# Patient Record
Sex: FEMALE | Race: WHITE | NOT HISPANIC OR LATINO | Employment: OTHER | URBAN - METROPOLITAN AREA
[De-identification: names, ages, dates, MRNs, and addresses within clinical notes are randomized per-mention and may not be internally consistent; named-entity substitution may affect disease eponyms.]

---

## 2017-01-03 RX ORDER — ALLOPURINOL 300 MG/1
TABLET ORAL
Qty: 90 TABLET | Refills: 1 | Status: SHIPPED | OUTPATIENT
Start: 2017-01-03 | End: 2017-07-02 | Stop reason: SDUPTHER

## 2017-01-09 RX ORDER — LEVALBUTEROL TARTRATE 45 MCG
HFA AEROSOL WITH ADAPTER (GRAM) INHALATION
Qty: 60 G | Refills: 0 | Status: SHIPPED | OUTPATIENT
Start: 2017-01-09 | End: 2017-04-07 | Stop reason: SDUPTHER

## 2017-02-17 RX ORDER — SPIRONOLACTONE 25 MG/1
TABLET ORAL
Qty: 90 TABLET | Refills: 1 | Status: SHIPPED | OUTPATIENT
Start: 2017-02-17 | End: 2017-08-16 | Stop reason: SDUPTHER

## 2017-02-17 RX ORDER — ROPINIROLE 0.5 MG/1
TABLET, FILM COATED ORAL
Qty: 180 TABLET | Refills: 1 | Status: SHIPPED | OUTPATIENT
Start: 2017-02-17 | End: 2017-08-16 | Stop reason: SDUPTHER

## 2017-03-17 RX ORDER — PYRIDOXINE HCL (VITAMIN B6) 100 MG
TABLET ORAL
Qty: 100 TABLET | Refills: 0 | Status: SHIPPED | OUTPATIENT
Start: 2017-03-17 | End: 2017-04-13 | Stop reason: SDUPTHER

## 2017-04-07 RX ORDER — LEVALBUTEROL TARTRATE 45 MCG
HFA AEROSOL WITH ADAPTER (GRAM) INHALATION
Qty: 60 G | Refills: 1 | Status: SHIPPED | OUTPATIENT
Start: 2017-04-07 | End: 2017-10-05 | Stop reason: SDUPTHER

## 2017-04-13 RX ORDER — TRIMETHOPRIM 100 MG/1
100 TABLET ORAL DAILY
Qty: 90 TABLET | Refills: 0 | Status: SHIPPED | OUTPATIENT
Start: 2017-04-13 | End: 2017-06-20 | Stop reason: SDUPTHER

## 2017-04-13 RX ORDER — PYRIDOXINE HCL (VITAMIN B6) 100 MG
100 TABLET ORAL 2 TIMES DAILY
Qty: 270 TABLET | Refills: 3 | Status: SHIPPED | OUTPATIENT
Start: 2017-04-13

## 2017-05-09 RX ORDER — CYANOCOBALAMIN 1000 UG/ML
INJECTION, SOLUTION INTRAMUSCULAR; SUBCUTANEOUS
Qty: 3 ML | Refills: 2 | Status: SHIPPED | OUTPATIENT
Start: 2017-05-09 | End: 2018-02-03 | Stop reason: SDUPTHER

## 2017-05-10 ENCOUNTER — OFFICE VISIT (OUTPATIENT)
Dept: INTERNAL MEDICINE | Facility: CLINIC | Age: 82
End: 2017-05-10

## 2017-05-10 ENCOUNTER — OFFICE VISIT (OUTPATIENT)
Dept: CARDIOLOGY | Facility: CLINIC | Age: 82
End: 2017-05-10

## 2017-05-10 VITALS
BODY MASS INDEX: 40.5 KG/M2 | DIASTOLIC BLOOD PRESSURE: 82 MMHG | WEIGHT: 175 LBS | HEIGHT: 55 IN | HEART RATE: 96 BPM | SYSTOLIC BLOOD PRESSURE: 144 MMHG

## 2017-05-10 VITALS
SYSTOLIC BLOOD PRESSURE: 140 MMHG | DIASTOLIC BLOOD PRESSURE: 90 MMHG | BODY MASS INDEX: 33.1 KG/M2 | RESPIRATION RATE: 16 BRPM | OXYGEN SATURATION: 95 % | HEART RATE: 85 BPM | TEMPERATURE: 97.7 F | HEIGHT: 60 IN | WEIGHT: 168.6 LBS

## 2017-05-10 DIAGNOSIS — Z79.899 MEDICATION MANAGEMENT: ICD-10-CM

## 2017-05-10 DIAGNOSIS — R53.83 FATIGUE, UNSPECIFIED TYPE: ICD-10-CM

## 2017-05-10 DIAGNOSIS — I27.20 PULMONARY HYPERTENSION (HCC): ICD-10-CM

## 2017-05-10 DIAGNOSIS — I48.19 PERSISTENT ATRIAL FIBRILLATION (HCC): ICD-10-CM

## 2017-05-10 DIAGNOSIS — I36.1 NON-RHEUMATIC TRICUSPID VALVE INSUFFICIENCY: ICD-10-CM

## 2017-05-10 DIAGNOSIS — J45.30 MILD PERSISTENT ASTHMA WITHOUT COMPLICATION: ICD-10-CM

## 2017-05-10 DIAGNOSIS — I34.0 NON-RHEUMATIC MITRAL REGURGITATION: ICD-10-CM

## 2017-05-10 DIAGNOSIS — I48.20 CHRONIC ATRIAL FIBRILLATION (HCC): Primary | ICD-10-CM

## 2017-05-10 DIAGNOSIS — R06.09 DYSPNEA ON EXERTION: ICD-10-CM

## 2017-05-10 DIAGNOSIS — I10 BENIGN ESSENTIAL HYPERTENSION: Primary | ICD-10-CM

## 2017-05-10 DIAGNOSIS — D47.2 MGUS (MONOCLONAL GAMMOPATHY OF UNKNOWN SIGNIFICANCE): ICD-10-CM

## 2017-05-10 PROCEDURE — G0439 PPPS, SUBSEQ VISIT: HCPCS | Performed by: INTERNAL MEDICINE

## 2017-05-10 PROCEDURE — 99214 OFFICE O/P EST MOD 30 MIN: CPT | Performed by: INTERNAL MEDICINE

## 2017-05-10 PROCEDURE — 93000 ELECTROCARDIOGRAM COMPLETE: CPT | Performed by: INTERNAL MEDICINE

## 2017-05-11 ENCOUNTER — TELEPHONE (OUTPATIENT)
Dept: INTERNAL MEDICINE | Facility: CLINIC | Age: 82
End: 2017-05-11

## 2017-05-11 RX ORDER — DILTIAZEM HYDROCHLORIDE 120 MG/1
120 TABLET, FILM COATED ORAL DAILY
Qty: 90 TABLET | Refills: 1 | Status: SHIPPED | OUTPATIENT
Start: 2017-05-11 | End: 2017-05-15 | Stop reason: SDUPTHER

## 2017-05-15 RX ORDER — DILTIAZEM HYDROCHLORIDE 120 MG/1
120 TABLET, FILM COATED ORAL DAILY
Qty: 90 TABLET | Refills: 1 | Status: SHIPPED | OUTPATIENT
Start: 2017-05-15 | End: 2017-05-17 | Stop reason: ALTCHOICE

## 2017-05-16 ENCOUNTER — TELEPHONE (OUTPATIENT)
Dept: INTERNAL MEDICINE | Facility: CLINIC | Age: 82
End: 2017-05-16

## 2017-05-16 ENCOUNTER — TELEPHONE (OUTPATIENT)
Dept: CARDIOLOGY | Facility: CLINIC | Age: 82
End: 2017-05-16

## 2017-05-16 LAB
ALBUMIN SERPL-MCNC: 4.4 G/DL (ref 3.5–5.2)
ALBUMIN/GLOB SERPL: 1.5 G/DL
ALP SERPL-CCNC: 51 U/L (ref 39–117)
ALT SERPL-CCNC: 16 U/L (ref 1–33)
AST SERPL-CCNC: 25 U/L (ref 1–32)
BILIRUB SERPL-MCNC: 0.7 MG/DL (ref 0.1–1.2)
BUN SERPL-MCNC: 26 MG/DL (ref 8–23)
BUN/CREAT SERPL: 20.2 (ref 7–25)
CALCIUM SERPL-MCNC: 9.8 MG/DL (ref 8.6–10.5)
CHLORIDE SERPL-SCNC: 96 MMOL/L (ref 98–107)
CO2 SERPL-SCNC: 24.8 MMOL/L (ref 22–29)
CREAT SERPL-MCNC: 1.29 MG/DL (ref 0.57–1)
FOLATE SERPL-MCNC: >20 NG/ML (ref 4.78–24.2)
GLOBULIN SER CALC-MCNC: 3 GM/DL
GLUCOSE SERPL-MCNC: 98 MG/DL (ref 65–99)
HCT VFR BLD AUTO: (no result) %
HGB BLD-MCNC: (no result) G/DL
IGA SERPL-MCNC: 117 MG/DL (ref 64–422)
IGG SERPL-MCNC: 1108 MG/DL (ref 700–1600)
IGM SERPL-MCNC: 43 MG/DL (ref 26–217)
KAPPA LC FREE SER-MCNC: 46.73 MG/L (ref 3.3–19.4)
KAPPA LC FREE/LAMBDA FREE SER: 2.27 {RATIO} (ref 0.26–1.65)
LAMBDA LC FREE SERPL-MCNC: 20.62 MG/L (ref 5.71–26.3)
METHYLMALONATE SERPL-SCNC: 213 NMOL/L (ref 0–378)
PLATELET # BLD AUTO: (no result) 10*3/UL
POTASSIUM SERPL-SCNC: 4 MMOL/L (ref 3.5–5.2)
PROT SERPL-MCNC: 7.4 G/DL (ref 6–8.5)
RBC # BLD AUTO: (no result) 10*6/UL
REQUEST PROBLEM: NORMAL
SODIUM SERPL-SCNC: 137 MMOL/L (ref 136–145)
TSH SERPL DL<=0.005 MIU/L-ACNC: 2.03 MIU/ML (ref 0.27–4.2)
VIT B12 SERPL-MCNC: 661 PG/ML (ref 211–946)
WBC # BLD AUTO: (no result) 10*3/MM3

## 2017-05-17 ENCOUNTER — TELEPHONE (OUTPATIENT)
Dept: INTERNAL MEDICINE | Facility: CLINIC | Age: 82
End: 2017-05-17

## 2017-05-17 RX ORDER — DILTIAZEM HYDROCHLORIDE 120 MG/1
120 CAPSULE, COATED, EXTENDED RELEASE ORAL DAILY
Qty: 30 CAPSULE | Refills: 3 | Status: SHIPPED | OUTPATIENT
Start: 2017-05-17 | End: 2017-06-05

## 2017-05-17 RX ORDER — PANTOPRAZOLE SODIUM 40 MG/1
40 TABLET, DELAYED RELEASE ORAL EVERY MORNING
Qty: 90 TABLET | Refills: 3 | Status: SHIPPED | OUTPATIENT
Start: 2017-05-17 | End: 2017-11-14 | Stop reason: HOSPADM

## 2017-05-17 RX ORDER — DILTIAZEM HYDROCHLORIDE 120 MG/1
120 CAPSULE, COATED, EXTENDED RELEASE ORAL DAILY
Qty: 90 CAPSULE | Refills: 3 | Status: SHIPPED | OUTPATIENT
Start: 2017-05-17 | End: 2017-05-17 | Stop reason: SDUPTHER

## 2017-05-18 RX ORDER — FOLIC ACID 1 MG/1
TABLET ORAL
Qty: 450 TABLET | Refills: 0 | Status: SHIPPED | OUTPATIENT
Start: 2017-05-18 | End: 2017-08-12 | Stop reason: SDUPTHER

## 2017-06-02 RX ORDER — MONTELUKAST SODIUM 10 MG/1
TABLET ORAL
Qty: 90 TABLET | Refills: 0 | Status: SHIPPED | OUTPATIENT
Start: 2017-06-02 | End: 2017-08-31 | Stop reason: SDUPTHER

## 2017-06-05 ENCOUNTER — TELEPHONE (OUTPATIENT)
Dept: CARDIOLOGY | Facility: CLINIC | Age: 82
End: 2017-06-05

## 2017-06-05 NOTE — TELEPHONE ENCOUNTER
Spoke with pt . With instructions per Dr. Ibrahim to start verapamil  mg daily and to decrease xarelto to 10 mg.Verbal understanding noted. Rx for verapamil  mg sent to [facility].

## 2017-06-05 NOTE — TELEPHONE ENCOUNTER
Pt. Called with complaints of nausea, jitteriness, and swelling in her legs. States that Dr. Pennington put her on cardizem 1 time and she did the same thing. She has held the cardizem since Saturday and sx's have improved.    Also she has been bruising very easily and badly since you increased her xarelto to 20 mg. Bruised some with 10 mg dose but not this badly.    Phone number to reach her is at her grandsons [phone]    Pharmacy there is [facility] [phone]

## 2017-06-20 RX ORDER — TRIMETHOPRIM 100 MG/1
100 TABLET ORAL DAILY
Qty: 90 TABLET | Refills: 0 | Status: SHIPPED | OUTPATIENT
Start: 2017-06-20 | End: 2017-08-28 | Stop reason: SDUPTHER

## 2017-06-29 DIAGNOSIS — G25.81 RLS (RESTLESS LEGS SYNDROME): Primary | ICD-10-CM

## 2017-06-29 RX ORDER — DIAZEPAM 5 MG/1
5 TABLET ORAL 2 TIMES DAILY PRN
Qty: 60 TABLET | Refills: 2 | Status: ON HOLD | OUTPATIENT
Start: 2017-06-29 | End: 2017-11-09 | Stop reason: SDUPTHER

## 2017-06-29 RX ORDER — DIAZEPAM 5 MG/1
5 TABLET ORAL 2 TIMES DAILY PRN
Qty: 60 TABLET | Refills: 2 | OUTPATIENT
Start: 2017-06-29 | End: 2017-06-29 | Stop reason: SDUPTHER

## 2017-06-29 NOTE — TELEPHONE ENCOUNTER
Pt requesting a refill for  Diazepam 5mg  Pt states last refill was in 2011.  Burning sensation on feet, and jerking on feet. Using it to help her sleep.  [facility]

## 2017-06-30 RX ORDER — DEXAMETHASONE 4 MG/1
TABLET ORAL
Qty: 36 G | Refills: 1 | Status: SHIPPED | OUTPATIENT
Start: 2017-06-30

## 2017-06-30 RX ORDER — BETAMETHASONE DIPROPIONATE 0.5 MG/G
CREAM TOPICAL
Qty: 50 G | Refills: 0 | Status: SHIPPED | OUTPATIENT
Start: 2017-06-30 | End: 2017-08-29 | Stop reason: SDUPTHER

## 2017-07-03 RX ORDER — ALLOPURINOL 300 MG/1
TABLET ORAL
Qty: 90 TABLET | Refills: 0 | Status: SHIPPED | OUTPATIENT
Start: 2017-07-03 | End: 2017-10-01 | Stop reason: SDUPTHER

## 2017-08-14 RX ORDER — FOLIC ACID 1 MG/1
TABLET ORAL
Qty: 450 TABLET | Refills: 1 | Status: SHIPPED | OUTPATIENT
Start: 2017-08-14 | End: 2018-01-29 | Stop reason: SDUPTHER

## 2017-08-16 RX ORDER — SPIRONOLACTONE 25 MG/1
TABLET ORAL
Qty: 90 TABLET | Refills: 0 | Status: ON HOLD | OUTPATIENT
Start: 2017-08-16 | End: 2017-11-14 | Stop reason: SDUPTHER

## 2017-08-16 RX ORDER — ROPINIROLE 0.5 MG/1
TABLET, FILM COATED ORAL
Qty: 180 TABLET | Refills: 0 | Status: ON HOLD | OUTPATIENT
Start: 2017-08-16 | End: 2017-11-14 | Stop reason: SDUPTHER

## 2017-08-22 ENCOUNTER — TELEPHONE (OUTPATIENT)
Dept: CARDIOLOGY | Facility: CLINIC | Age: 82
End: 2017-08-22

## 2017-08-22 NOTE — TELEPHONE ENCOUNTER
Patient called to report extreme fatigue with Verapamil  mg every evening. She states she is in bed all the time. She takes the medication at night and she does not feel like she has any energy until about 2:00 in the afternoon.  She tried to only take 1/2 tablet to see if she felt better but it did not help.      Patient has tried Cardizem and had problems with it prior to you changing her to Verapamil  mg.    Patient's phone number is [phone] / KATHY

## 2017-08-24 RX ORDER — ESOMEPRAZOLE MAGNESIUM 40 MG/1
40 CAPSULE, DELAYED RELEASE ORAL 2 TIMES DAILY
Qty: 180 CAPSULE | Refills: 0 | Status: SHIPPED | OUTPATIENT
Start: 2017-08-24 | End: 2017-08-24 | Stop reason: SDUPTHER

## 2017-08-28 RX ORDER — TRIMETHOPRIM 100 MG/1
100 TABLET ORAL DAILY
Qty: 90 TABLET | Refills: 1 | Status: SHIPPED | OUTPATIENT
Start: 2017-08-28 | End: 2018-05-19 | Stop reason: SDUPTHER

## 2017-08-30 RX ORDER — BETAMETHASONE DIPROPIONATE 0.5 MG/G
CREAM TOPICAL
Qty: 50 G | Refills: 0 | Status: SHIPPED | OUTPATIENT
Start: 2017-08-30 | End: 2018-11-13 | Stop reason: SDUPTHER

## 2017-08-31 RX ORDER — MONTELUKAST SODIUM 10 MG/1
TABLET ORAL
Qty: 90 TABLET | Refills: 0 | Status: SHIPPED | OUTPATIENT
Start: 2017-08-31 | End: 2017-11-29 | Stop reason: SDUPTHER

## 2017-09-09 LAB
ALBUMIN SERPL-MCNC: 4.2 G/DL (ref 3.5–4.7)
ALBUMIN/GLOB SERPL: 1.4 {RATIO} (ref 1.2–2.2)
ALP SERPL-CCNC: 47 IU/L (ref 39–117)
ALT SERPL-CCNC: 14 IU/L (ref 0–32)
AST SERPL-CCNC: 34 IU/L (ref 0–40)
BILIRUB SERPL-MCNC: 0.5 MG/DL (ref 0–1.2)
BUN SERPL-MCNC: 27 MG/DL (ref 8–27)
BUN/CREAT SERPL: 18 (ref 12–28)
CALCIUM SERPL-MCNC: 9.6 MG/DL (ref 8.7–10.3)
CHLORIDE SERPL-SCNC: 98 MMOL/L (ref 96–106)
CO2 SERPL-SCNC: 22 MMOL/L (ref 18–29)
CREAT SERPL-MCNC: 1.52 MG/DL (ref 0.57–1)
ERYTHROCYTE [DISTWIDTH] IN BLOOD BY AUTOMATED COUNT: 14.6 % (ref 12.3–15.4)
GLOBULIN SER CALC-MCNC: 3 G/DL (ref 1.5–4.5)
GLUCOSE SERPL-MCNC: 102 MG/DL (ref 65–99)
HCT VFR BLD AUTO: 39.1 % (ref 34–46.6)
HGB BLD-MCNC: 13 G/DL (ref 11.1–15.9)
MCH RBC QN AUTO: 36.4 PG (ref 26.6–33)
MCHC RBC AUTO-ENTMCNC: 33.2 G/DL (ref 31.5–35.7)
MCV RBC AUTO: 110 FL (ref 79–97)
PLATELET # BLD AUTO: 184 X10E3/UL (ref 150–379)
POTASSIUM SERPL-SCNC: 5.6 MMOL/L (ref 3.5–5.2)
PROT SERPL-MCNC: 7.2 G/DL (ref 6–8.5)
RBC # BLD AUTO: 3.57 X10E6/UL (ref 3.77–5.28)
SODIUM SERPL-SCNC: 140 MMOL/L (ref 134–144)
WBC # BLD AUTO: 5.3 X10E3/UL (ref 3.4–10.8)

## 2017-10-02 RX ORDER — ALLOPURINOL 300 MG/1
TABLET ORAL
Qty: 90 TABLET | Refills: 1 | Status: SHIPPED | OUTPATIENT
Start: 2017-10-02 | End: 2018-03-31 | Stop reason: SDUPTHER

## 2017-10-05 RX ORDER — LEVALBUTEROL TARTRATE 45 MCG
HFA AEROSOL WITH ADAPTER (GRAM) INHALATION
Qty: 60 G | Refills: 1 | Status: SHIPPED | OUTPATIENT
Start: 2017-10-05 | End: 2018-04-03 | Stop reason: SDUPTHER

## 2017-11-06 RX ORDER — CHOLECALCIFEROL (VITAMIN D3) 1250 MCG
CAPSULE ORAL
Qty: 26 CAPSULE | Refills: 2 | Status: ON HOLD | OUTPATIENT
Start: 2017-11-06 | End: 2017-11-09 | Stop reason: SDUPTHER

## 2017-11-09 ENCOUNTER — APPOINTMENT (OUTPATIENT)
Dept: GENERAL RADIOLOGY | Facility: HOSPITAL | Age: 82
End: 2017-11-09

## 2017-11-09 ENCOUNTER — HOSPITAL ENCOUNTER (INPATIENT)
Facility: HOSPITAL | Age: 82
LOS: 3 days | Discharge: HOME HEALTH | End: 2017-11-14
Attending: INTERNAL MEDICINE | Admitting: INTERNAL MEDICINE

## 2017-11-09 ENCOUNTER — APPOINTMENT (OUTPATIENT)
Dept: CT IMAGING | Facility: HOSPITAL | Age: 82
End: 2017-11-09

## 2017-11-09 DIAGNOSIS — K44.9 PARAESOPHAGEAL HERNIA: Primary | ICD-10-CM

## 2017-11-09 DIAGNOSIS — G25.81 RLS (RESTLESS LEGS SYNDROME): ICD-10-CM

## 2017-11-09 LAB
ALBUMIN SERPL-MCNC: 4.3 G/DL (ref 3.5–5.2)
ALBUMIN/GLOB SERPL: 1.4 G/DL
ALP SERPL-CCNC: 48 U/L (ref 39–117)
ALT SERPL W P-5'-P-CCNC: 15 U/L (ref 1–33)
ANION GAP SERPL CALCULATED.3IONS-SCNC: 13.4 MMOL/L
ANISOCYTOSIS BLD QL: NORMAL
AST SERPL-CCNC: 26 U/L (ref 1–32)
BASOPHILS # BLD AUTO: 0.01 10*3/MM3 (ref 0–0.2)
BASOPHILS NFR BLD AUTO: 0.2 % (ref 0–1.5)
BILIRUB SERPL-MCNC: 0.6 MG/DL (ref 0.1–1.2)
BUN BLD-MCNC: 22 MG/DL (ref 8–23)
BUN/CREAT SERPL: 16.2 (ref 7–25)
CALCIUM SPEC-SCNC: 9.4 MG/DL (ref 8.6–10.5)
CHLORIDE SERPL-SCNC: 91 MMOL/L (ref 98–107)
CO2 SERPL-SCNC: 25.6 MMOL/L (ref 22–29)
CREAT BLD-MCNC: 1.36 MG/DL (ref 0.57–1)
DEPRECATED RDW RBC AUTO: 60.5 FL (ref 37–54)
EOSINOPHIL # BLD AUTO: 0.05 10*3/MM3 (ref 0–0.7)
EOSINOPHIL NFR BLD AUTO: 1 % (ref 0.3–6.2)
ERYTHROCYTE [DISTWIDTH] IN BLOOD BY AUTOMATED COUNT: 14.7 % (ref 11.7–13)
GFR SERPL CREATININE-BSD FRML MDRD: 37 ML/MIN/1.73
GLOBULIN UR ELPH-MCNC: 3.1 GM/DL
GLUCOSE BLD-MCNC: 105 MG/DL (ref 65–99)
HCT VFR BLD AUTO: 38.5 % (ref 35.6–45.5)
HGB BLD-MCNC: 12.6 G/DL (ref 11.9–15.5)
IMM GRANULOCYTES # BLD: 0 10*3/MM3 (ref 0–0.03)
IMM GRANULOCYTES NFR BLD: 0 % (ref 0–0.5)
LYMPHOCYTES # BLD AUTO: 2.03 10*3/MM3 (ref 0.9–4.8)
LYMPHOCYTES NFR BLD AUTO: 40.5 % (ref 19.6–45.3)
MACROCYTES BLD QL SMEAR: NORMAL
MAGNESIUM SERPL-MCNC: 1.6 MG/DL (ref 1.6–2.4)
MCH RBC QN AUTO: 36.7 PG (ref 26.9–32)
MCHC RBC AUTO-ENTMCNC: 32.7 G/DL (ref 32.4–36.3)
MCV RBC AUTO: 112.2 FL (ref 80.5–98.2)
MONOCYTES # BLD AUTO: 0.38 10*3/MM3 (ref 0.2–1.2)
MONOCYTES NFR BLD AUTO: 7.6 % (ref 5–12)
NEUTROPHILS # BLD AUTO: 2.54 10*3/MM3 (ref 1.9–8.1)
NEUTROPHILS NFR BLD AUTO: 50.7 % (ref 42.7–76)
NRBC BLD MANUAL-RTO: 0 /100 WBC (ref 0–0)
NT-PROBNP SERPL-MCNC: 2547 PG/ML (ref 0–1800)
PLAT MORPH BLD: NORMAL
PLATELET # BLD AUTO: 175 10*3/MM3 (ref 140–500)
PMV BLD AUTO: 10.3 FL (ref 6–12)
POTASSIUM BLD-SCNC: 4.5 MMOL/L (ref 3.5–5.2)
PROT SERPL-MCNC: 7.4 G/DL (ref 6–8.5)
RBC # BLD AUTO: 3.43 10*6/MM3 (ref 3.9–5.2)
SODIUM BLD-SCNC: 130 MMOL/L (ref 136–145)
TROPONIN T SERPL-MCNC: <0.01 NG/ML (ref 0–0.03)
WBC MORPH BLD: NORMAL
WBC NRBC COR # BLD: 5.01 10*3/MM3 (ref 4.5–10.7)

## 2017-11-09 PROCEDURE — 99223 1ST HOSP IP/OBS HIGH 75: CPT | Performed by: INTERNAL MEDICINE

## 2017-11-09 PROCEDURE — G0378 HOSPITAL OBSERVATION PER HR: HCPCS

## 2017-11-09 PROCEDURE — 71020 HC CHEST PA AND LATERAL: CPT

## 2017-11-09 PROCEDURE — 83880 ASSAY OF NATRIURETIC PEPTIDE: CPT | Performed by: INTERNAL MEDICINE

## 2017-11-09 PROCEDURE — 84484 ASSAY OF TROPONIN QUANT: CPT | Performed by: INTERNAL MEDICINE

## 2017-11-09 PROCEDURE — 74176 CT ABD & PELVIS W/O CONTRAST: CPT

## 2017-11-09 PROCEDURE — 85025 COMPLETE CBC W/AUTO DIFF WBC: CPT | Performed by: INTERNAL MEDICINE

## 2017-11-09 PROCEDURE — 93005 ELECTROCARDIOGRAM TRACING: CPT | Performed by: INTERNAL MEDICINE

## 2017-11-09 PROCEDURE — 83735 ASSAY OF MAGNESIUM: CPT | Performed by: INTERNAL MEDICINE

## 2017-11-09 PROCEDURE — 80053 COMPREHEN METABOLIC PANEL: CPT | Performed by: INTERNAL MEDICINE

## 2017-11-09 PROCEDURE — 94799 UNLISTED PULMONARY SVC/PX: CPT

## 2017-11-09 PROCEDURE — 85007 BL SMEAR W/DIFF WBC COUNT: CPT | Performed by: INTERNAL MEDICINE

## 2017-11-09 PROCEDURE — 93010 ELECTROCARDIOGRAM REPORT: CPT | Performed by: INTERNAL MEDICINE

## 2017-11-09 PROCEDURE — 0 DIATRIZOATE MEGLUMINE & SODIUM PER 1 ML: Performed by: INTERNAL MEDICINE

## 2017-11-09 PROCEDURE — 99214 OFFICE O/P EST MOD 30 MIN: CPT | Performed by: INTERNAL MEDICINE

## 2017-11-09 PROCEDURE — 94640 AIRWAY INHALATION TREATMENT: CPT

## 2017-11-09 RX ORDER — PROMETHAZINE HYDROCHLORIDE 25 MG/ML
12.5 INJECTION, SOLUTION INTRAMUSCULAR; INTRAVENOUS EVERY 6 HOURS PRN
Status: DISCONTINUED | OUTPATIENT
Start: 2017-11-09 | End: 2017-11-12 | Stop reason: HOSPADM

## 2017-11-09 RX ORDER — PROMETHAZINE HYDROCHLORIDE 25 MG/1
12.5 TABLET ORAL EVERY 6 HOURS PRN
Status: DISCONTINUED | OUTPATIENT
Start: 2017-11-09 | End: 2017-11-12 | Stop reason: HOSPADM

## 2017-11-09 RX ORDER — PANTOPRAZOLE SODIUM 40 MG/1
40 TABLET, DELAYED RELEASE ORAL
Status: DISCONTINUED | OUTPATIENT
Start: 2017-11-09 | End: 2017-11-12 | Stop reason: HOSPADM

## 2017-11-09 RX ORDER — CHOLECALCIFEROL (VITAMIN D3) 1250 MCG
CAPSULE ORAL
Qty: 12 CAPSULE | Refills: 0 | Status: SHIPPED | OUTPATIENT
Start: 2017-11-09 | End: 2017-12-21 | Stop reason: SDUPTHER

## 2017-11-09 RX ORDER — FOLIC ACID 1 MG/1
5 TABLET ORAL DAILY
Status: DISCONTINUED | OUTPATIENT
Start: 2017-11-09 | End: 2017-11-12 | Stop reason: HOSPADM

## 2017-11-09 RX ORDER — LEVALBUTEROL TARTRATE 45 UG/1
2 AEROSOL, METERED ORAL EVERY 6 HOURS PRN
Status: DISCONTINUED | OUTPATIENT
Start: 2017-11-09 | End: 2017-11-09 | Stop reason: CLARIF

## 2017-11-09 RX ORDER — DIAZEPAM 5 MG/1
5 TABLET ORAL 2 TIMES DAILY PRN
Status: DISCONTINUED | OUTPATIENT
Start: 2017-11-09 | End: 2017-11-12 | Stop reason: HOSPADM

## 2017-11-09 RX ORDER — LEVALBUTEROL TARTRATE 45 UG/1
2 AEROSOL, METERED ORAL EVERY 6 HOURS PRN
Status: DISCONTINUED | OUTPATIENT
Start: 2017-11-09 | End: 2017-11-09 | Stop reason: SDUPTHER

## 2017-11-09 RX ORDER — ACETAMINOPHEN 325 MG/1
650 TABLET ORAL EVERY 4 HOURS PRN
Status: DISCONTINUED | OUTPATIENT
Start: 2017-11-09 | End: 2017-11-12 | Stop reason: HOSPADM

## 2017-11-09 RX ORDER — DOCUSATE SODIUM 100 MG/1
100 CAPSULE, LIQUID FILLED ORAL 2 TIMES DAILY
Status: DISCONTINUED | OUTPATIENT
Start: 2017-11-09 | End: 2017-11-12 | Stop reason: HOSPADM

## 2017-11-09 RX ORDER — DIAZEPAM 5 MG/1
5 TABLET ORAL 2 TIMES DAILY PRN
Qty: 60 TABLET | Refills: 2 | Status: SHIPPED | OUTPATIENT
Start: 2017-11-09

## 2017-11-09 RX ORDER — ALBUTEROL SULFATE 2.5 MG/3ML
2.5 SOLUTION RESPIRATORY (INHALATION) EVERY 6 HOURS PRN
Status: DISCONTINUED | OUTPATIENT
Start: 2017-11-09 | End: 2017-11-12 | Stop reason: HOSPADM

## 2017-11-09 RX ORDER — BUDESONIDE 0.5 MG/2ML
0.5 INHALANT ORAL
Status: DISCONTINUED | OUTPATIENT
Start: 2017-11-09 | End: 2017-11-12 | Stop reason: HOSPADM

## 2017-11-09 RX ORDER — LORAZEPAM 1 MG/1
1 TABLET ORAL EVERY 6 HOURS PRN
Status: DISCONTINUED | OUTPATIENT
Start: 2017-11-09 | End: 2017-11-12 | Stop reason: HOSPADM

## 2017-11-09 RX ORDER — MONTELUKAST SODIUM 10 MG/1
10 TABLET ORAL DAILY
Status: DISCONTINUED | OUTPATIENT
Start: 2017-11-09 | End: 2017-11-12

## 2017-11-09 RX ORDER — ALLOPURINOL 300 MG/1
300 TABLET ORAL DAILY
Status: DISCONTINUED | OUTPATIENT
Start: 2017-11-09 | End: 2017-11-11

## 2017-11-09 RX ORDER — PROMETHAZINE HYDROCHLORIDE 25 MG/1
12.5 SUPPOSITORY RECTAL EVERY 6 HOURS PRN
Status: DISCONTINUED | OUTPATIENT
Start: 2017-11-09 | End: 2017-11-12 | Stop reason: HOSPADM

## 2017-11-09 RX ORDER — SODIUM CHLORIDE 0.9 % (FLUSH) 0.9 %
1-10 SYRINGE (ML) INJECTION AS NEEDED
Status: DISCONTINUED | OUTPATIENT
Start: 2017-11-09 | End: 2017-11-12 | Stop reason: HOSPADM

## 2017-11-09 RX ORDER — ROPINIROLE 0.5 MG/1
0.5 TABLET, FILM COATED ORAL NIGHTLY
Status: DISCONTINUED | OUTPATIENT
Start: 2017-11-09 | End: 2017-11-12 | Stop reason: HOSPADM

## 2017-11-09 RX ADMIN — ROPINIROLE HYDROCHLORIDE 0.5 MG: 0.5 TABLET, FILM COATED ORAL at 20:19

## 2017-11-09 RX ADMIN — BUDESONIDE 0.5 MG: 0.5 INHALANT RESPIRATORY (INHALATION) at 19:54

## 2017-11-09 RX ADMIN — DOCUSATE SODIUM 100 MG: 100 CAPSULE, LIQUID FILLED ORAL at 17:43

## 2017-11-09 RX ADMIN — PANTOPRAZOLE SODIUM 40 MG: 40 TABLET, DELAYED RELEASE ORAL at 17:43

## 2017-11-09 RX ADMIN — DIATRIZOATE MEGLUMINE AND DIATRIZOATE SODIUM 30 ML: 600; 100 SOLUTION ORAL; RECTAL at 20:50

## 2017-11-09 RX ADMIN — BUDESONIDE 0.5 MG: 0.5 INHALANT RESPIRATORY (INHALATION) at 14:35

## 2017-11-09 RX ADMIN — METOPROLOL TARTRATE 25 MG: 25 TABLET ORAL at 20:19

## 2017-11-09 NOTE — PLAN OF CARE
Problem: Patient Care Overview (Adult)  Goal: Plan of Care Review  Outcome: Ongoing (interventions implemented as appropriate)    11/09/17 1539   Coping/Psychosocial Response Interventions   Plan Of Care Reviewed With patient   Patient Care Overview   Progress improving   Outcome Evaluation   Outcome Summary/Follow up Plan Vitals stable. Afib on monitor. SOA with activity. chest xray done. Echo ordered. no pain or nausea today. Will continue to monitor.        Goal: Adult Individualization and Mutuality  Outcome: Ongoing (interventions implemented as appropriate)  Goal: Discharge Needs Assessment  Outcome: Ongoing (interventions implemented as appropriate)    Problem: Respiratory Insufficiency (Adult)  Goal: Identify Related Risk Factors and Signs and Symptoms  Outcome: Ongoing (interventions implemented as appropriate)  Goal: Acid/Base Balance  Outcome: Ongoing (interventions implemented as appropriate)  Goal: Effective Ventilation  Outcome: Ongoing (interventions implemented as appropriate)

## 2017-11-09 NOTE — CONSULTS
"Date of Hospital Visit: 17  Encounter Provider: Horace Amador MD  Place of Service: [facility] CARDIOLOGY  Patient Name: Uzma Jerome  :10/22/1930  Referral Provider: Paramjit Pennington MD    Chief complaint: exertional wretching    History of Present Illness: Mrs. Jerome is a 87-year old woman who follows with Dr. Ibrahim.  An echo in 2015 showed preserved LV systolic function, but moderate MR, moderately severe TR, and moderate PTHN.  At that time, she complained of exertional dyspnea and fatigue, which has never improved.  Over the last few weeks, she now has \"wretching\" after walking, but she only brings up phlegm. She has not had actual vomiting.    She has a prior history of PE, and is on rivaroxaban, but only at a VTE prophylaxis dose of 10mg daily, and not a dose for AF, as she had excessive bruising while on 15mg daily.  She has CKD, a large hiatal hernia, hypertension, and gout.    She was last seen in the office in May 2017 at which time she reported that she continued to have significant exertional fatigue and dyspnea. At that time BP was 144/82 and HR 96 (AF). No changes were made to her medical regimen. In , she called with nausea, and she was changed from diltiazem to verapamil.  She disliked this as well and was changed to metoprolol.      Currently, she's sitting up in bed and is very comfortable at rest.  She denies orthopnea, chest pain, edema, lightheadedness, palpitations, or syncope.  An EKG shows AF and is unchanged from prior studies.  A CXR shows no pulmonary edema.     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     mod MR, mod-severe TR   • Gout    • History of pulmonary embolus (PE)    • Hypertension    • MGUS " "(monoclonal gammopathy of unknown significance)    • Osteoporosis    • Peripheral neuropathy    • Permanent atrial fibrillation    • Pulmonary hypertension     secondary to long standing diastolic dysfunction   • Type 2 diabetes mellitus    • Venous stasis ulcers of both lower extremities        Past Surgical History:   Procedure Laterality Date   • ANKLE SURGERY     • APPENDECTOMY     • HYSTERECTOMY     • REPLACEMENT TOTAL KNEE Bilateral    • TONSILLECTOMY         Prior to Admission medications    Medication Sig Start Date End Date Taking? Authorizing Provider   alendronate (FOSAMAX) 70 MG tablet Take 70 mg by mouth Every 7 (Seven) Days. 1/29/15  Yes Historical Provider, MD   allopurinol (ZYLOPRIM) 300 MG tablet TAKE 1 TABLET DAILY 10/2/17   Paramjit Pennington MD   B-D 3CC LUER-NICKY SYR 26GX5/8\" 26G X 5/8\" 3 ML misc USE 1 MONTHLY 8/29/16   Paramjit Pennington MD   betamethasone, augmented, (DIPROLENE) 0.05 % cream APPLY EXTERNALLY DAILY 8/30/17   Paramjit Pennington MD   budesonide (PULMICORT FLEXHALER) 180 MCG/ACT inhaler 2 (Two) Times a Day. 1/29/15   Historical Provider, MD   Cholecalciferol (VITAMIN D3) 54800 units capsule TAKE TWO CAPSULE BY MOUTH ONCE WEEKLY 11/6/17   Paramjit Pennington MD   cyanocobalamin 1000 MCG/ML injection INJECT 1 ML MONTHLY 5/9/17   Paramjit Pennington MD   diazePAM (VALIUM) 5 MG tablet Take 1 tablet by mouth 2 (Two) Times a Day As Needed for Muscle Spasms. 6/29/17   Paramjit Pennington MD   diclofenac (VOLTAREN) 1 % gel gel Apply 4 g topically 2 (Two) Times a Day. 9/14/17   Paramjit Pennington MD   doxycycline (VIBRAMYICN) 100 MG tablet TAKE 1 TABLET TWICE A DAY 1/3/17   Paramjit Pennington MD   FLOVENT  MCG/ACT inhaler USE 2 INHALATIONS TWICE A DAY 6/30/17   Paramjit Pennington MD   folic acid (FOLVITE) 1 MG tablet TAKE 5 TABLETS DAILY 8/14/17   Paramjit Pennington MD   GNP VITAMIN B-6 100 MG tablet TAKE TWO TABLETS BY MOUTH DAILY  11/14/16   Paramjit Pennington MD   methotrexate 2.5 MG tablet TAKE 3 TABLETS ONCE A WEEK 10/23/17  "  Paramjit Pennington MD   metoprolol tartrate (LOPRESSOR) 25 MG tablet TAKE ONE-HALF (1/2) TABLET TWICE A DAY (CHANGE FROM VERAPAMIL TO METOPROLOL TARTRATE) 11/3/17   Paramjit Ibrahim MD   montelukast (SINGULAIR) 10 MG tablet TAKE 1 TABLET DAILY 8/31/17   Paramjit Pennington MD   NEXIUM 40 MG capsule TAKE 1 CAPSULE TWICE A DAY 8/24/17   Paramjit Pennington MD   pantoprazole (PROTONIX) 40 MG EC tablet Take 1 tablet by mouth Every Morning. 5/17/17   Paramjit Pennington MD   pyridoxine (GNP VITAMIN B-6) 100 MG tablet Take 1 tablet by mouth 2 (Two) Times a Day. 4/13/17   Paramjit Pennington MD   rivaroxaban (XARELTO) 10 MG tablet Take 1 tablet by mouth Daily. 6/7/17   Paramjit Ibrahim MD   rOPINIRole (REQUIP) 0.5 MG tablet TAKE 2 TABLETS AT BEDTIME 8/16/17   Paramjit Pennington MD   spironolactone (ALDACTONE) 25 MG tablet TAKE 1 TABLET DAILY 8/16/17   Paramjit Pennington MD   trimethoprim (TRIMPEX) 100 MG tablet Take 1 tablet by mouth Daily for 90 days. 8/28/17 11/26/17  Paramjit Pennington MD   XOPENEX HFA 45 MCG/ACT inhaler USE 2 INHALATIONS FOUR TIMES A DAY AS NEEDED 10/5/17   Paramjit Pennington MD       Social History     Social History   • Marital status:      Spouse name: N/A   • Number of children: N/A   • Years of education: N/A     Occupational History   • Not on file.     Social History Main Topics   • Smoking status: Former Smoker   • Smokeless tobacco: Never Used   • Alcohol use No   • Drug use: No   • Sexual activity: Not on file     Other Topics Concern   • Not on file     Social History Narrative       Family History   Problem Relation Age of Onset   • Cancer Sister    • Diabetes Sister    • Hypertension Sister        Review of Systems   Constitutional: Positive for fatigue.   Respiratory: Positive for cough and shortness of breath.    Gastrointestinal: Positive for nausea.   All other systems reviewed and are negative.      Objective:     Vitals:    11/09/17 1233 11/09/17 1355 11/09/17 1435   BP: 142/84     BP Location: Left arm    "  Patient Position: Sitting     Pulse: 84 71 68   Resp: 18 16 18   Temp: 98.3 °F (36.8 °C) 98.7 °F (37.1 °C)    TempSrc: Oral Oral    SpO2: 100% 100% 99%   Height: 60\" (152.4 cm)       There is no height or weight on file to calculate BMI.  Flowsheet Rows         First Filed Value    Admission Height  60\" (152.4 cm) Documented at 11/09/2017 1233    Admission Weight            Physical Exam   Constitutional: She is oriented to person, place, and time. She appears well-developed and well-nourished.   HENT:   Head: Normocephalic.   Nose: Nose normal.   Mouth/Throat: Oropharynx is clear and moist.   Eyes:   Cloudy corneas bilaterally   Neck: Normal range of motion. No JVD present.   Cardiovascular: Normal rate and intact distal pulses.  An irregularly irregular rhythm present.   Murmur heard.   Systolic murmur is present with a grade of 3/6   Pulmonary/Chest: Effort normal and breath sounds normal.   Abdominal: Soft. She exhibits no mass. There is no tenderness.   Musculoskeletal: Normal range of motion. She exhibits no edema.   Neurological: She is alert and oriented to person, place, and time. No cranial nerve deficit.   Skin: Skin is warm and dry. No erythema.   Psychiatric: She has a normal mood and affect. Her behavior is normal. Judgment and thought content normal.   Vitals reviewed.              Lab Review:                  Results from last 7 days  Lab Units 11/09/17  1305   SODIUM mmol/L 130*   POTASSIUM mmol/L 4.5   CHLORIDE mmol/L 91*   CO2 mmol/L 25.6   BUN mg/dL 22   CREATININE mg/dL 1.36*   GLUCOSE mg/dL 105*   CALCIUM mg/dL 9.4           Results from last 7 days  Lab Units 11/09/17  1305   WBC 10*3/mm3 5.01   HEMOGLOBIN g/dL 12.6   HEMATOCRIT % 38.5   PLATELETS 10*3/mm3 175               Results from last 7 days  Lab Units 11/09/17  1305   MAGNESIUM mg/dL 1.6       Current EKG: pending     Previous:       I personally viewed and interpreted the patient's EKG/Telemetry data    Assessment/Plan:     Her " chief complaint is that she has exertional wretching and she brings up phlegm.  I'm not sure how to explain this, honestly.      Regarding her exertional fatigue and dyspnea, this has been present since Dr. Ibrahim first met her in January 2015, and it doesn't seem to have changed.  She has no peripheral or pulmonary edema, no orthopnea, and her BNP isn't really that high given her age and CKD.  I will repeat an echo, although I don't think it will change our management.    Regarding her AF, she's rate controlled.  She's on a VTE ppx dose of rivaroxaban and not an AF dose as she had too much bruising on 15mg daily.    Her SBP is generally in the 140's, which is very reasonable for her age and CKD.

## 2017-11-09 NOTE — H&P
Patient Care Team:  Paramjit Pennington MD as PCP - Internal Medicine (General Practice)  Paramjit Ibrahim MD as Consulting Physician (Cardiology)    Chief complaint     Subjective     Patient is a 87 y.o. female presents with nausea and vomiting on exertion.  These symptoms began about 2 months ago.  They seem to be progressively worsening.  She can barely walk 30 feet without marked symptomatology.  She's been very sedentary over these past 2 months.  She was pretty severely symptomatic just walking in from the front door into my office today.  She was retching by the time she sat down.  She seemed dyspneic.  She recovered fairly quickly after 5 minutes or so.  She denies chest pain.  No chest pain on exertion.  She denies difficulty with bowel habits or heartburn she is on twice daily doses of PPIs.  She has a pretty extensive medical history including pulmonary artery hypertension and history of pulmonary embolus.  No recent URI symptoms.  No fever.  No chills.  No urinary complaints.  No other GI complaints.  Some of the retching seems be mucus and is difficult to say whether it's pulmonary or GI in origin.    Review of Systems    All other pertinent items are noted in HPI, all other systems reviewed and negative    History    Current Facility-Administered Medications:   •  acetaminophen (TYLENOL) tablet 650 mg, 650 mg, Oral, Q4H PRN, Paramjit Pennington MD  •  allopurinol (ZYLOPRIM) tablet 300 mg, 300 mg, Oral, Daily, Paramjit Pennington MD  •  budesonide (PULMICORT) nebulizer solution 0.5 mg, 0.5 mg, Nebulization, BID - RT, Paramjit Pennington MD  •  diazePAM (VALIUM) tablet 5 mg, 5 mg, Oral, BID PRN, Paramjit Pennington MD  •  docusate sodium (COLACE) capsule 100 mg, 100 mg, Oral, BID, Paramjit Pennington MD  •  folic acid (FOLVITE) tablet 5 mg, 5 mg, Oral, Daily, Paramjit Pennington MD  •  levalbuterol (XOPENEX HFA) inhaler 2 puff, 2 puff, Inhalation, Q6H PRN, Paramjit Pennington MD  •  LORazepam (ATIVAN) tablet 1 mg, 1 mg, Oral, Q6H PRN,  "Paramjit Pennington MD  •  magnesium hydroxide (MILK OF MAGNESIA) suspension 2400 mg/10mL 10 mL, 10 mL, Oral, Daily PRN, Paramjit Pennington MD  •  metoprolol tartrate (LOPRESSOR) tablet 25 mg, 25 mg, Oral, Q12H, Paramjit Pennington MD  •  montelukast (SINGULAIR) tablet 10 mg, 10 mg, Oral, Daily, Paramjit Pennington MD  •  pantoprazole (PROTONIX) EC tablet 40 mg, 40 mg, Oral, BID AC, Paramjit Pennington MD  •  promethazine (PHENERGAN) tablet 12.5 mg, 12.5 mg, Oral, Q6H PRN **OR** promethazine (PHENERGAN) injection 12.5 mg, 12.5 mg, Intramuscular, Q6H PRN **OR** promethazine (PHENERGAN) suppository 12.5 mg, 12.5 mg, Rectal, Q6H PRN, Paramjit Pennington MD  •  rOPINIRole (REQUIP) tablet 0.5 mg, 0.5 mg, Oral, Nightly, Paramjit Pennington MD  •  sodium chloride 0.9 % flush 1-10 mL, 1-10 mL, Intravenous, PRN, Paramjit Pennington MD  No past medical history on file.  Past Surgical History:   Procedure Laterality Date   • ANKLE SURGERY     • APPENDECTOMY     • HYSTERECTOMY     • REPLACEMENT TOTAL KNEE Bilateral    • TONSILLECTOMY       Family History   Problem Relation Age of Onset   • Cancer Sister    • Diabetes Sister    • Hypertension Sister      Social History   Substance Use Topics   • Smoking status: Former Smoker   • Smokeless tobacco: Not on file   • Alcohol use Yes     Prescriptions Prior to Admission   Medication Sig Dispense Refill Last Dose   • alendronate (FOSAMAX) 70 MG tablet Take 70 mg by mouth Every 7 (Seven) Days.   Taking   • allopurinol (ZYLOPRIM) 300 MG tablet TAKE 1 TABLET DAILY 90 tablet 1    • B-D 3CC LUER-NICKY SYR 26GX5/8\" 26G X 5/8\" 3 ML misc USE 1 MONTHLY 100 each 2 Taking   • betamethasone, augmented, (DIPROLENE) 0.05 % cream APPLY EXTERNALLY DAILY 50 g 0    • budesonide (PULMICORT FLEXHALER) 180 MCG/ACT inhaler 2 (Two) Times a Day.   Taking   • Cholecalciferol (VITAMIN D3) 08084 units capsule TAKE TWO CAPSULE BY MOUTH ONCE WEEKLY 26 capsule 2    • cyanocobalamin 1000 MCG/ML injection INJECT 1 ML MONTHLY 3 mL 2 Taking   • diazePAM " (VALIUM) 5 MG tablet Take 1 tablet by mouth 2 (Two) Times a Day As Needed for Muscle Spasms. 60 tablet 2    • diclofenac (VOLTAREN) 1 % gel gel Apply 4 g topically 2 (Two) Times a Day. 100 g 1    • doxycycline (VIBRAMYICN) 100 MG tablet TAKE 1 TABLET TWICE A DAY 20 tablet 1 Taking   • FLOVENT  MCG/ACT inhaler USE 2 INHALATIONS TWICE A DAY 36 g 1    • folic acid (FOLVITE) 1 MG tablet TAKE 5 TABLETS DAILY 450 tablet 1    • GNP VITAMIN B-6 100 MG tablet TAKE TWO TABLETS BY MOUTH DAILY  100 tablet 0 Taking   • methotrexate 2.5 MG tablet TAKE 3 TABLETS ONCE A WEEK 36 tablet 0    • metoprolol tartrate (LOPRESSOR) 25 MG tablet TAKE ONE-HALF (1/2) TABLET TWICE A DAY (CHANGE FROM VERAPAMIL TO METOPROLOL TARTRATE) 90 tablet 1    • montelukast (SINGULAIR) 10 MG tablet TAKE 1 TABLET DAILY 90 tablet 0    • NEXIUM 40 MG capsule TAKE 1 CAPSULE TWICE A  capsule 0    • pantoprazole (PROTONIX) 40 MG EC tablet Take 1 tablet by mouth Every Morning. 90 tablet 3    • pyridoxine (GNP VITAMIN B-6) 100 MG tablet Take 1 tablet by mouth 2 (Two) Times a Day. 270 tablet 3 Taking   • rivaroxaban (XARELTO) 10 MG tablet Take 1 tablet by mouth Daily. 90 tablet 1    • rOPINIRole (REQUIP) 0.5 MG tablet TAKE 2 TABLETS AT BEDTIME 180 tablet 0    • spironolactone (ALDACTONE) 25 MG tablet TAKE 1 TABLET DAILY 90 tablet 0    • trimethoprim (TRIMPEX) 100 MG tablet Take 1 tablet by mouth Daily for 90 days. 90 tablet 1    • XOPENEX HFA 45 MCG/ACT inhaler USE 2 INHALATIONS FOUR TIMES A DAY AS NEEDED 60 g 1      Allergies:  Aspirin; Influenza a (h1n1) monovalent vaccine; and Tetanus toxoid    Objective     Vital Signs  Temp:  [97.6 °F (36.4 °C)-98.3 °F (36.8 °C)] 98.3 °F (36.8 °C)  Heart Rate:  [84] 84  Resp:  [18] 18  BP: (110-142)/(60-84) 142/84    Physical Exam:      General Appearance:    Alert, cooperative, in Moderate acute distress.  She is pale and chronically ill-appearing    Head:    Normocephalic, without obvious abnormality, atraumatic    Eyes:            Lids and lashes normal, conjunctivae and sclerae normal, no   icterus, no pallor, corneas clear, PERRLA   Ears:    Ears appear intact with no abnormalities noted   Throat:   No oral lesions, no thrush, oral mucosa moist   Neck:   No adenopathy, supple, trachea midline, no thyromegaly, no     carotid bruit, no JVD   Back:     No kyphosis present, no scoliosis present, no skin lesions,       erythema or scars, no tenderness to percussion or                   palpation,   range of motion normal   Lungs:     Clear to auscultation,respirations regular, even and                   unlabored    Heart:    Irregular rhythm and normal rate, normal S1 and S2, no            murmur, no gallop, no rub, no click   Breast Exam:    Deferred   Abdomen:     Normal bowel sounds, no masses, no organomegaly, soft        non-tender, non-distended, no guarding, no rebound                 tenderness   Genitalia:    Deferred   Extremities:   Moves all extremities well, no edema, no cyanosis,                      Skin:   No bleeding, bruising or rash   Lymph nodes:   No palpable adenopathy   Neurologic:   Cranial nerves 2 - 12 grossly intact           Results Review:    None  Assessment/Plan     Active Problems:    Dyspnea  Chronic atrial fibrillation  Hypertension  Pulmonary artery hypertension  History of mitral and tricuspid regurgitation  History of asthma  GERD  Peripheral neuropathy  Osteoporosis  History of bullous pemphigoid  MGUS  History of gout  Restless leg syndrome      Nausea and vomiting with exertion.  This is a most unusual symptom.  Nonetheless her exertional capacity is terrible at the present time and over the past 2 months.  Certainly we have to look for a cardiac or pulmonary source of her symptoms.  Just a profound degree of symptoms is very worrisome.  I plan to admit her for 23 hours, particularly since she lives 3 hours away, in order to check some screening lab work and have cardiology weigh in.   I'm specifically interested in her electrolytes and serum potassium today since her potassium was up a tad last time she is on Aldactone.    I discussed the patients findings and my recommendations with patient.     Paramjit Pennington MD  11/09/17  12:53 PM    Dragon disclaimer:   Much of this encounter note is an electronic transcription/translation of spoken language to printed text. The electronic translation of spoken language may permit erroneous, or at times, nonsensical words or phrases to be inadvertently transcribed; Although I have reviewed the note for such errors, some may still exist.

## 2017-11-09 NOTE — TELEPHONE ENCOUNTER
Last Refill:  6/29/2017  Last O.V.:   11/9/2017  Next O.V:    None Scheduled  Siddharth:      11/9/2017    Send to [facility]

## 2017-11-10 ENCOUNTER — APPOINTMENT (OUTPATIENT)
Dept: CT IMAGING | Facility: HOSPITAL | Age: 82
End: 2017-11-10
Attending: INTERNAL MEDICINE

## 2017-11-10 ENCOUNTER — APPOINTMENT (OUTPATIENT)
Dept: RESPIRATORY THERAPY | Facility: HOSPITAL | Age: 82
End: 2017-11-10
Attending: INTERNAL MEDICINE

## 2017-11-10 ENCOUNTER — APPOINTMENT (OUTPATIENT)
Dept: CARDIOLOGY | Facility: HOSPITAL | Age: 82
End: 2017-11-10
Attending: INTERNAL MEDICINE

## 2017-11-10 ENCOUNTER — APPOINTMENT (OUTPATIENT)
Dept: NUCLEAR MEDICINE | Facility: HOSPITAL | Age: 82
End: 2017-11-10

## 2017-11-10 LAB
BH CV ECHO MEAS - ACS: 2 CM
BH CV ECHO MEAS - AI DEC SLOPE: 124.5 CM/SEC^2
BH CV ECHO MEAS - AI MAX PG: 65 MMHG
BH CV ECHO MEAS - AI MAX VEL: 403 CM/SEC
BH CV ECHO MEAS - AI P1/2T: 948.1 MSEC
BH CV ECHO MEAS - AO MAX PG (FULL): 1.9 MMHG
BH CV ECHO MEAS - AO MAX PG: 6.9 MMHG
BH CV ECHO MEAS - AO MEAN PG (FULL): 0 MMHG
BH CV ECHO MEAS - AO MEAN PG: 3 MMHG
BH CV ECHO MEAS - AO ROOT AREA (BSA CORRECTED): 1.7
BH CV ECHO MEAS - AO ROOT AREA: 7.1 CM^2
BH CV ECHO MEAS - AO ROOT DIAM: 3 CM
BH CV ECHO MEAS - AO V2 MAX: 131 CM/SEC
BH CV ECHO MEAS - AO V2 MEAN: 80.5 CM/SEC
BH CV ECHO MEAS - AO V2 VTI: 29.8 CM
BH CV ECHO MEAS - AVA(I,A): 2.4 CM^2
BH CV ECHO MEAS - AVA(I,D): 2.4 CM^2
BH CV ECHO MEAS - AVA(V,A): 2.4 CM^2
BH CV ECHO MEAS - AVA(V,D): 2.4 CM^2
BH CV ECHO MEAS - BSA(HAYCOCK): 1.8 M^2
BH CV ECHO MEAS - BSA: 1.7 M^2
BH CV ECHO MEAS - BZI_BMI: 32.6 KILOGRAMS/M^2
BH CV ECHO MEAS - BZI_METRIC_HEIGHT: 152.4 CM
BH CV ECHO MEAS - BZI_METRIC_WEIGHT: 75.8 KG
BH CV ECHO MEAS - CONTRAST EF (2CH): 63.9 ML/M^2
BH CV ECHO MEAS - CONTRAST EF 4CH: 61 ML/M^2
BH CV ECHO MEAS - EDV(CUBED): 64 ML
BH CV ECHO MEAS - EDV(MOD-SP2): 72 ML
BH CV ECHO MEAS - EDV(MOD-SP4): 59 ML
BH CV ECHO MEAS - EDV(TEICH): 70 ML
BH CV ECHO MEAS - EF(CUBED): 83.4 %
BH CV ECHO MEAS - EF(MOD-SP2): 63.9 %
BH CV ECHO MEAS - EF(MOD-SP4): 61 %
BH CV ECHO MEAS - EF(TEICH): 76.9 %
BH CV ECHO MEAS - ESV(CUBED): 10.6 ML
BH CV ECHO MEAS - ESV(MOD-SP2): 26 ML
BH CV ECHO MEAS - ESV(MOD-SP4): 23 ML
BH CV ECHO MEAS - ESV(TEICH): 16.2 ML
BH CV ECHO MEAS - FS: 45 %
BH CV ECHO MEAS - IVS/LVPW: 1
BH CV ECHO MEAS - IVSD: 0.9 CM
BH CV ECHO MEAS - LAT PEAK E' VEL: 7 CM/SEC
BH CV ECHO MEAS - LV DIASTOLIC VOL/BSA (35-75): 34.1 ML/M^2
BH CV ECHO MEAS - LV MASS(C)D: 109.7 GRAMS
BH CV ECHO MEAS - LV MASS(C)DI: 63.4 GRAMS/M^2
BH CV ECHO MEAS - LV MAX PG: 4.9 MMHG
BH CV ECHO MEAS - LV MEAN PG: 3 MMHG
BH CV ECHO MEAS - LV SYSTOLIC VOL/BSA (12-30): 13.3 ML/M^2
BH CV ECHO MEAS - LV V1 MAX: 111 CM/SEC
BH CV ECHO MEAS - LV V1 MEAN: 75.1 CM/SEC
BH CV ECHO MEAS - LV V1 VTI: 25.5 CM
BH CV ECHO MEAS - LVIDD: 4 CM
BH CV ECHO MEAS - LVIDS: 2.2 CM
BH CV ECHO MEAS - LVLD AP2: 6.5 CM
BH CV ECHO MEAS - LVLD AP4: 6.4 CM
BH CV ECHO MEAS - LVLS AP2: 5.7 CM
BH CV ECHO MEAS - LVLS AP4: 5.5 CM
BH CV ECHO MEAS - LVOT AREA (M): 2.8 CM^2
BH CV ECHO MEAS - LVOT AREA: 2.8 CM^2
BH CV ECHO MEAS - LVOT DIAM: 1.9 CM
BH CV ECHO MEAS - LVPWD: 0.9 CM
BH CV ECHO MEAS - MED PEAK E' VEL: 6 CM/SEC
BH CV ECHO MEAS - MV A DUR: 0.1 SEC
BH CV ECHO MEAS - MV A MAX VEL: 47.5 CM/SEC
BH CV ECHO MEAS - MV DEC SLOPE: 810 CM/SEC^2
BH CV ECHO MEAS - MV DEC TIME: 0.16 SEC
BH CV ECHO MEAS - MV E MAX VEL: 107 CM/SEC
BH CV ECHO MEAS - MV E/A: 2.3
BH CV ECHO MEAS - MV MAX PG: 4.7 MMHG
BH CV ECHO MEAS - MV MEAN PG: 2 MMHG
BH CV ECHO MEAS - MV P1/2T MAX VEL: 111 CM/SEC
BH CV ECHO MEAS - MV P1/2T: 40.1 MSEC
BH CV ECHO MEAS - MV V2 MAX: 108 CM/SEC
BH CV ECHO MEAS - MV V2 MEAN: 66.9 CM/SEC
BH CV ECHO MEAS - MV V2 VTI: 23.8 CM
BH CV ECHO MEAS - MVA P1/2T LCG: 2 CM^2
BH CV ECHO MEAS - MVA(P1/2T): 5.5 CM^2
BH CV ECHO MEAS - MVA(VTI): 3 CM^2
BH CV ECHO MEAS - PA ACC TIME: 0.14 SEC
BH CV ECHO MEAS - PA MAX PG (FULL): 0.85 MMHG
BH CV ECHO MEAS - PA MAX PG: 2.7 MMHG
BH CV ECHO MEAS - PA PR(ACCEL): 17.4 MMHG
BH CV ECHO MEAS - PA V2 MAX: 82.2 CM/SEC
BH CV ECHO MEAS - PVA(V,A): 3.4 CM^2
BH CV ECHO MEAS - PVA(V,D): 3.4 CM^2
BH CV ECHO MEAS - QP/QS: 0.89
BH CV ECHO MEAS - RAP SYSTOLE: 3 MMHG
BH CV ECHO MEAS - RV MAX PG: 1.8 MMHG
BH CV ECHO MEAS - RV MEAN PG: 1 MMHG
BH CV ECHO MEAS - RV V1 MAX: 68 CM/SEC
BH CV ECHO MEAS - RV V1 MEAN: 42.8 CM/SEC
BH CV ECHO MEAS - RV V1 VTI: 15.5 CM
BH CV ECHO MEAS - RVOT AREA: 4.2 CM^2
BH CV ECHO MEAS - RVOT DIAM: 2.3 CM
BH CV ECHO MEAS - RVSP: 45.8 MMHG
BH CV ECHO MEAS - SI(AO): 121.8 ML/M^2
BH CV ECHO MEAS - SI(CUBED): 30.9 ML/M^2
BH CV ECHO MEAS - SI(LVOT): 41.8 ML/M^2
BH CV ECHO MEAS - SI(MOD-SP2): 26.6 ML/M^2
BH CV ECHO MEAS - SI(MOD-SP4): 20.8 ML/M^2
BH CV ECHO MEAS - SI(TEICH): 31.1 ML/M^2
BH CV ECHO MEAS - SUP REN AO DIAM: 1.7 CM
BH CV ECHO MEAS - SV(AO): 210.6 ML
BH CV ECHO MEAS - SV(CUBED): 53.4 ML
BH CV ECHO MEAS - SV(LVOT): 72.3 ML
BH CV ECHO MEAS - SV(MOD-SP2): 46 ML
BH CV ECHO MEAS - SV(MOD-SP4): 36 ML
BH CV ECHO MEAS - SV(RVOT): 64.4 ML
BH CV ECHO MEAS - SV(TEICH): 53.8 ML
BH CV ECHO MEAS - TAPSE (>1.6): 2.9 CM2
BH CV ECHO MEAS - TR MAX VEL: 327 CM/SEC
BH CV VAS BP RIGHT ARM: NORMAL MMHG
BH CV XLRA - RV BASE: 3.7 CM
BH CV XLRA - TDI S': 10 CM/SEC
E/E' RATIO: 18
LEFT ATRIUM VOLUME INDEX: 51 ML/M2
MAXIMAL PREDICTED HEART RATE: 133 BPM
STRESS TARGET HR: 113 BPM

## 2017-11-10 PROCEDURE — 94010 BREATHING CAPACITY TEST: CPT

## 2017-11-10 PROCEDURE — 99205 OFFICE O/P NEW HI 60 MIN: CPT | Performed by: SURGERY

## 2017-11-10 PROCEDURE — G0378 HOSPITAL OBSERVATION PER HR: HCPCS

## 2017-11-10 PROCEDURE — 0 TECHNETIUM ALBUMIN AGGREGATED: Performed by: INTERNAL MEDICINE

## 2017-11-10 PROCEDURE — 99212 OFFICE O/P EST SF 10 MIN: CPT | Performed by: INTERNAL MEDICINE

## 2017-11-10 PROCEDURE — 93306 TTE W/DOPPLER COMPLETE: CPT

## 2017-11-10 PROCEDURE — 94726 PLETHYSMOGRAPHY LUNG VOLUMES: CPT

## 2017-11-10 PROCEDURE — 94799 UNLISTED PULMONARY SVC/PX: CPT

## 2017-11-10 PROCEDURE — 93306 TTE W/DOPPLER COMPLETE: CPT | Performed by: INTERNAL MEDICINE

## 2017-11-10 PROCEDURE — A9558 XE133 XENON 10MCI: HCPCS | Performed by: INTERNAL MEDICINE

## 2017-11-10 PROCEDURE — 78582 LUNG VENTILAT&PERFUS IMAGING: CPT

## 2017-11-10 PROCEDURE — 71250 CT THORAX DX C-: CPT

## 2017-11-10 PROCEDURE — 99233 SBSQ HOSP IP/OBS HIGH 50: CPT | Performed by: INTERNAL MEDICINE

## 2017-11-10 PROCEDURE — 94729 DIFFUSING CAPACITY: CPT

## 2017-11-10 PROCEDURE — A9540 TC99M MAA: HCPCS | Performed by: INTERNAL MEDICINE

## 2017-11-10 PROCEDURE — 0 XENON XE 133: Performed by: INTERNAL MEDICINE

## 2017-11-10 RX ORDER — RIVAROXABAN 10 MG/1
TABLET, FILM COATED ORAL
Qty: 90 TABLET | Refills: 1 | Status: SHIPPED | OUTPATIENT
Start: 2017-11-10 | End: 2018-04-21 | Stop reason: SDUPTHER

## 2017-11-10 RX ADMIN — XENON XE-133 5.3 MILLICURIE: 10 GAS RESPIRATORY (INHALATION) at 11:27

## 2017-11-10 RX ADMIN — METOPROLOL TARTRATE 25 MG: 25 TABLET ORAL at 20:21

## 2017-11-10 RX ADMIN — BUDESONIDE 0.5 MG: 0.5 INHALANT RESPIRATORY (INHALATION) at 20:41

## 2017-11-10 RX ADMIN — ALLOPURINOL 300 MG: 300 TABLET ORAL at 09:48

## 2017-11-10 RX ADMIN — BUDESONIDE 0.5 MG: 0.5 INHALANT RESPIRATORY (INHALATION) at 08:48

## 2017-11-10 RX ADMIN — ROPINIROLE HYDROCHLORIDE 0.5 MG: 0.5 TABLET, FILM COATED ORAL at 20:21

## 2017-11-10 RX ADMIN — PANTOPRAZOLE SODIUM 40 MG: 40 TABLET, DELAYED RELEASE ORAL at 06:34

## 2017-11-10 RX ADMIN — METOPROLOL TARTRATE 25 MG: 25 TABLET ORAL at 09:48

## 2017-11-10 RX ADMIN — Medication 1 DOSE: at 12:00

## 2017-11-10 RX ADMIN — PANTOPRAZOLE SODIUM 40 MG: 40 TABLET, DELAYED RELEASE ORAL at 19:28

## 2017-11-10 RX ADMIN — MONTELUKAST 10 MG: 10 TABLET, FILM COATED ORAL at 09:48

## 2017-11-10 RX ADMIN — FOLIC ACID 5 MG: 1 TABLET ORAL at 09:54

## 2017-11-10 NOTE — PROGRESS NOTES
Discharge Planning Assessment  [facility]     Patient Name: Uzma Jerome  MRN: [id number]  Today's Date: 11/10/2017    Admit Date: 11/9/2017          Discharge Needs Assessment       11/10/17 1030    Living Environment    Lives With alone    Living Arrangements house    Home Accessibility no concerns    Stair Railings at Home outside, present on left side    Type of Financial/Environmental Concern none    Transportation Available car    Living Environment    Provides Primary Care For no one    Quality Of Family Relationships supportive   sister and 4 children ar very helpful    Able to Return to Prior Living Arrangements yes    Discharge Needs Assessment    Concerns To Be Addressed no discharge needs identified    Readmission Within The Last 30 Days no previous admission in last 30 days    Outpatient/Agency/Support Group Needs homecare agency (specify level of care)   has used home heallth in [place] in the past but is unsure of agency    Anticipated Changes Related to Illness none    Equipment Currently Used at Home cane, straight;walker, standard;shower chair;nebulizer    Equipment Needed After Discharge none            Discharge Plan       11/10/17 1034    Case Management/Social Work Plan    Plan Home with family support    Patient/Family In Agreement With Plan unable to assess    Additional Comments Met at bedside with pt who states she lives at home alone in [facility]. Per pt she has used home health after knee surgery and does not know the name of that agency. She has never been to rehab. She does have a walker, cane, BSC, and nebulizer. Her PCP is Paramjit Pennington and pharmacy is [facility] [phone]. Pt denies problems affording or obtaining medications. CCP will follow and assist as needed....drc        Discharge Placement     No information found                Demographic Summary       11/10/17 1026    Referral Information    Admission Type observation    Referral Source admission list     Record Reviewed clinical discipline documentation;history and physical;medical record;patient profile    Primary Care Physician Information    Name Paramjit Pennington            Functional Status       11/10/17 1027    Functional Status Current    Ambulation 1-->assistive equipment    Transferring 1-->assistive equipment    Toileting 1-->assistive equipment    Bathing 1-->assistive equipment    Dressing 2-->assistive person    Eating 2-->assistive person    Communication 0-->understands/communicates without difficulty    Swallowing (if score 2 or more for any item, consult Rehab Services) 0-->swallows foods/liquids without difficulty    Functional Status Prior    Ambulation 1-->assistive equipment    Transferring 1-->assistive equipment    Toileting 1-->assistive equipment    Bathing 2-->assistive person    Dressing 2-->assistive person    Eating 2-->assistive person    Communication 0-->understands/communicates without difficulty    Swallowing 0-->swallows foods/liquids without difficulty            Psychosocial     None            Abuse/Neglect     None            Legal     None            Substance Abuse     None            Patient Forms     None          Rosa Branch RN

## 2017-11-10 NOTE — PROGRESS NOTES
"CC: AFib    Interval History:   Patient also forward reviewed her vitals on monitor    Vital Signs  Temp:  [97.6 °F (36.4 °C)-98.9 °F (37.2 °C)] 98.9 °F (37.2 °C)  Heart Rate:  [62-84] 65  Resp:  [16-18] 18  BP: (110-142)/(60-84) 130/74    Intake/Output Summary (Last 24 hours) at 11/10/17 0726  Last data filed at 11/10/17 0500   Gross per 24 hour   Intake                0 ml   Output             1050 ml   Net            -1050 ml     Flowsheet Rows         First Filed Value    Admission Height  60\" (152.4 cm) Documented at 11/09/2017 1233    Admission Weight  168 lb 12.8 oz (76.6 kg) Documented at 11/09/2017 1445          PHYSICAL EXAM:    Results Review:      Results from last 7 days  Lab Units 11/09/17  1305   SODIUM mmol/L 130*   POTASSIUM mmol/L 4.5   CHLORIDE mmol/L 91*   CO2 mmol/L 25.6   BUN mg/dL 22   CREATININE mg/dL 1.36*   GLUCOSE mg/dL 105*   CALCIUM mg/dL 9.4       Results from last 7 days  Lab Units 11/09/17  1305   TROPONIN T ng/mL <0.010       Results from last 7 days  Lab Units 11/09/17  1305   WBC 10*3/mm3 5.01   HEMOGLOBIN g/dL 12.6   HEMATOCRIT % 38.5   PLATELETS 10*3/mm3 175               Results from last 7 days  Lab Units 11/09/17  1305   MAGNESIUM mg/dL 1.6         @LABRCNT(bnp)@  I reviewed the patient's new clinical results.  I personally viewed and interpreted the patient's EKG/Telemetry data        Medication Review:   Meds reviewed         Assessment/Plan  1. This is an 87-year-old female with atrial fibrillation.   • The patient's CHADS2-VASc score is 5.  More bruising on higher dose of Xarelto continue the same.  We will see when necessary we'll check her echo cardiac and but from our standpoint she could be discharged.  2. Dyspnea on exertion/fatigue.    3. Valvular heart disease with moderate mitral insufficiency.   4. Moderate pulmonary hypertension with moderate-to-severe tricuspid insufficiency.   5. History of hypertension. Blood pressure adequately controlled.   6. History of " large hiatal hernia.        Paramjit Ibrahim MD  11/10/17  7:26 AM

## 2017-11-10 NOTE — CONSULTS
SUMMARY (A/P):    87-year-old lady with large paraesophageal hiatal hernia which is likely causing her moderately severe presenting symptoms.  She has the option of doing nothing which she does not favor in light of her current symptoms.  She also the option of laparoscopic versus transthoracic approach to repairing this hiatal hernia.  Although it is quite large I think based on my review of images we should be able to get this done laparoscopically.  She would prefer that approach.  She understands the rationale for the procedure, the nature of the procedure, and the risks including but not limited to bleeding, infection, conversion to open procedure, injury to surrounding structures, cardiac and pulmonary events including those leading to death, and recurrence of the problem.  She also understands that her risks associated with surgery are increased secondary to her age and comorbidities.    She was on Xarelto until Thursday evening and I have discussed her situation with Dr. Pennington and he has recommended waiting until Sunday to proceed with surgery.  I have scheduled her accordingly.      CC:  Hiatal hernia    HPI:  Reports for one month she gets markedly short of breath when walking, associated with nausea and emesis. No chest pain. Weight loss of 5 pounds. No hematemesis.  Also has associated history of GERD for 10-15 years, takes nexium which helps. No pain or nausea unless eats too much, does report early satiety.    PHYSICAL EXAM:   Constitutional: Well-developed well-nourished, no acute distress   Heart rate 64, RR 18, /82, T 97.6   Weight (pounds) 167   Height (inches) 60  Eyes: Conjunctiva normal, sclera nonicteric  ENMT: Hearing grossly normal, oral mucosa moist  Neck: Supple, no palpable mass, normal thyroid, trachea midline  Respiratory: Clear to auscultation, normal inspiratory effort  Cardiovascular: Regular rate, no murmur, no carotid bruit, no peripheral edema, no jugular venous  distention  Gastrointestinal: Soft, nontender, no palpable mass, no hepatosplenomegaly, negative for hernia, bowel sounds normal  Lymphatics (palpable nodes):  cervical-negative, axillary-negative  Skin:  Warm, dry, no rash on visualized skin surfaces  Musculoskeletal: Symmetric strength, normal gait  Psychiatric: Alert and oriented ×3, normal affect     ALLERGIES: reviewed, in Epic    MEDICATIONS: reviewed, in Epic    PMH:    Pulmonary hypertension  Atrial fibrillation (no history of stroke or TIA)  Bullous pemphigoid  Hypertension   Osteoporosis  Chronic diastolic CHF  Gout   Chronic kidney disease    PSH:    Appendectomy  Hysterectomy, BSO  Bilateral knee replacements    FAMILY HISTORY:    Negative for colon or stomach or esophageal cancer    SOCIAL HISTORY:   Denies tobacco use  Occasional alcohol use    ROS:  No chest pain or shortness of air.  All other systems reviewed and negative other than presenting complaints.    RADIOLOGY/ENDOSCOPY:    CT chest/abdomen/pelvis very large paraesophageal hiatal hernia, reviewed images and concur    LABS:    BUN 22, creatinine 1.36  WBC 5, Hgb 12.6, platelets 175    KELLY RUIZ M.D.

## 2017-11-10 NOTE — CONSULTS
Patient Care Team:  Paramjit Pennington MD as PCP - Internal Medicine (General Practice)  Paramjit Ibrahim MD as Consulting Physician (Cardiology)      Subjective     Patient is a 87 y.o. female.  Asked to see by Dr. PINZON to evaluate dyspnea with exertion.  His was discussed with Dr. Pennington and with the patient.  She has had progressively increasing dyspnea with exertion minimal exertion and she gets very winded and starts throwing up.  She is averaging about 3 episodes of emesis a week now.  She feels like she can eat okay and doesn't get terribly short of breath when she eats but she has been losing weight and not intentionally or not because of increased activity.  She was found to have a very large hiatal hernia.  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  She does have significant heartburn and reflux but is relatively well controlled with Nexium only if she eats spicy foods to she feel it any now and she does sleep lying back she does cough up some phlegm are typically in the morning.      Review of Systems:  Patient has a history of moderate mitral regurgitation and severe TR with moderate pulmonary hypertension with preserved LV systolic function she just had an echocardiogram repeated most like LVEF is still preserved.  She's had a history of a PE and is been on rivaroxaban for this.  She does have chronic kidney disease stage III history of hypertension and gout.  She's not had any problems with the gout recently her blood pressures been pretty well controlled.  She has no history of sleep apnea is not a loud snorer that she is aware of.  No history of liver disease or hepatitis no recent hematemesis no melena or hematochezia.  Never had a heart attack.  Her some history her chart of a mild monoclonal gammopathy she's no real clear on that also has some type 2 diabetes      History  Past Medical  "History:   Diagnosis Date   • Asthma    • Bullous pemphigoid    • Chronic diastolic CHF (congestive heart failure)    • CKD (chronic kidney disease) stage 3, GFR 30-59 ml/min    • Disorders of both mitral and tricuspid valves     mod MR, mod-severe TR   • Gout    • History of pulmonary embolus (PE)    • Hypertension    • MGUS (monoclonal gammopathy of unknown significance)    • Osteoporosis    • Peripheral neuropathy    • Permanent atrial fibrillation    • Pulmonary hypertension     secondary to long standing diastolic dysfunction   • Type 2 diabetes mellitus    • Venous stasis ulcers of both lower extremities      Past Surgical History:   Procedure Laterality Date   • ANKLE SURGERY     • APPENDECTOMY     • HYSTERECTOMY     • REPLACEMENT TOTAL KNEE Bilateral    • TONSILLECTOMY       Social History     Social History   • Marital status:      Spouse name: N/A   • Number of children: N/A   • Years of education: N/A     Social History Main Topics   • Smoking status: Former Smoker   • Smokeless tobacco: Never Used   • Alcohol use No   • Drug use: No   • Sexual activity: Not Asked     Other Topics Concern   • None     Social History Narrative     Family History   Problem Relation Age of Onset   • Cancer Sister    • Diabetes Sister    • Hypertension Sister          Allergies:  Aspirin; Influenza a (h1n1) monovalent vaccine; and Tetanus toxoid    Medications:  Prior to Admission medications    Medication Sig Start Date End Date Taking? Authorizing Provider   alendronate (FOSAMAX) 70 MG tablet Take 70 mg by mouth Every 7 (Seven) Days. 1/29/15  Yes Historical Provider, MD   allopurinol (ZYLOPRIM) 300 MG tablet TAKE 1 TABLET DAILY 10/2/17   Paramjit Pennington MD   B-D 3CC LUER-NICKY SYR 26GX5/8\" 26G X 5/8\" 3 ML misc USE 1 MONTHLY 8/29/16   Paramjit Pennington MD   betamethasone, augmented, (DIPROLENE) 0.05 % cream APPLY EXTERNALLY DAILY 8/30/17   Paramjit Pennington MD   budesonide (PULMICORT FLEXHALER) 180 MCG/ACT inhaler 2 (Two) " Times a Day. 1/29/15   Michael Emery MD   Cholecalciferol (VITAMIN D3) 63146 units capsule Take 1 capsule by mouth once weekly 11/9/17   Paramjit Pennington MD   cyanocobalamin 1000 MCG/ML injection INJECT 1 ML MONTHLY 5/9/17   Paramjit Pennington MD   diazePAM (VALIUM) 5 MG tablet Take 1 tablet by mouth 2 (Two) Times a Day As Needed for Muscle Spasms. 11/9/17   Paramjit Pennington MD   diclofenac (VOLTAREN) 1 % gel gel Apply 4 g topically 2 (Two) Times a Day. 9/14/17   Paramjit Pennington MD   doxycycline (VIBRAMYICN) 100 MG tablet TAKE 1 TABLET TWICE A DAY 1/3/17   Paramjit Pennington MD   FLOVENT  MCG/ACT inhaler USE 2 INHALATIONS TWICE A DAY 6/30/17   Paramjit Pennington MD   folic acid (FOLVITE) 1 MG tablet TAKE 5 TABLETS DAILY 8/14/17   Paramjit Pennington MD   GNP VITAMIN B-6 100 MG tablet TAKE TWO TABLETS BY MOUTH DAILY  11/14/16   Paramjit Pennington MD   methotrexate 2.5 MG tablet TAKE 3 TABLETS ONCE A WEEK 10/23/17   Paramjit Pennington MD   metoprolol tartrate (LOPRESSOR) 25 MG tablet TAKE ONE-HALF (1/2) TABLET TWICE A DAY (CHANGE FROM VERAPAMIL TO METOPROLOL TARTRATE) 11/3/17   Paramjit Ibrahim MD   montelukast (SINGULAIR) 10 MG tablet TAKE 1 TABLET DAILY 8/31/17   Paramjit Pennington MD   NEXIUM 40 MG capsule TAKE 1 CAPSULE TWICE A DAY 8/24/17   Paramjit Pennington MD   pantoprazole (PROTONIX) 40 MG EC tablet Take 1 tablet by mouth Every Morning. 5/17/17   Paramjit Pennington MD   pyridoxine (GNP VITAMIN B-6) 100 MG tablet Take 1 tablet by mouth 2 (Two) Times a Day. 4/13/17   Paramjit Pennington MD   rivaroxaban (XARELTO) 10 MG tablet Take 1 tablet by mouth Daily. 6/7/17   Paramjit Ibrahim MD   rOPINIRole (REQUIP) 0.5 MG tablet TAKE 2 TABLETS AT BEDTIME 8/16/17   Paramjit Pennington MD   spironolactone (ALDACTONE) 25 MG tablet TAKE 1 TABLET DAILY 8/16/17   Paramjit Pennington MD   trimethoprim (TRIMPEX) 100 MG tablet Take 1 tablet by mouth Daily for 90 days. 8/28/17 11/26/17  Paramjit Pennington MD   XOPENEX HFA 45 MCG/ACT inhaler USE 2 INHALATIONS FOUR TIMES A DAY  "AS NEEDED 10/5/17   Paramjit Pennington MD       allopurinol 300 mg Oral Daily   budesonide 0.5 mg Nebulization BID - RT   docusate sodium 100 mg Oral BID   folic acid 5 mg Oral Daily   metoprolol tartrate 25 mg Oral Q12H   montelukast 10 mg Oral Daily   pantoprazole 40 mg Oral BID AC   rOPINIRole 0.5 mg Oral Nightly          Objective     Vital Signs  Vital Sign Min/Max for last 24 hours  Temp  Min: 97.6 °F (36.4 °C)  Max: 98.9 °F (37.2 °C)   BP  Min: 110/60  Max: 142/84   Pulse  Min: 62  Max: 84   Resp  Min: 16  Max: 18   SpO2  Min: 93 %  Max: 100 %   No Data Recorded   Weight  Min: 167 lb 6.4 oz (75.9 kg)  Max: 168 lb 12.8 oz (76.6 kg)       Intake/Output Summary (Last 24 hours) at 11/10/17 0938  Last data filed at 11/10/17 0748   Gross per 24 hour   Intake              360 ml   Output             1050 ml   Net             -690 ml     I/O this shift:  In: 360 [P.O.:360]  Out: -   Last Weight and Admission Weight    Last Weight    11/10/17  0500   Weight: 167 lb 6.4 oz (75.9 kg)     Flowsheet Rows         First Filed Value    Admission Height  60\" (152.4 cm) Documented at 11/09/2017 1233    Admission Weight  168 lb 12.8 oz (76.6 kg) Documented at 11/09/2017 1445          Body mass index is 32.69 kg/(m^2).           Physical Exam:  General Appearance: Well-developed elderly white female she is sitting on the side of the bed in no acute distress on room air her oxygen saturations are about 94%  Eyes: Conjunctiva are clear and anicteric pupils are equal and reactive to light  ENT: Mucous membranes are moist no erythema or exudates she has a Mallampati type I airway  Neck: Trachea midline no jugular venous distention no hepatojugular reflux no palpable adenopathy or thyromegaly  Lungs: Clear I don't hear any wheezes rales or rhonchi she actually has some dullness in the left base on percussion and she has moderate thoracic kyphosis and some scoliosis  Cardiac: Irregularly irregular really don't hear murmur  Abdomen: Soft " no palpable organomegaly or masses  : Not examined  Musc/Skel: Grossly normal  Skin: No jaundice, no rashes, no petechiae  Neuro: Alert and oriented she is cooperative following commands she is pretty weak when she gets up to walk  Extremities/P Vascular: No clubbing cyanosis or edema she has palpable radial dorsalis pedis pulses  MSE: Seems to be in relatively good spirits      Labs:    Results from last 7 days  Lab Units 11/09/17  1305   GLUCOSE mg/dL 105*   SODIUM mmol/L 130*   POTASSIUM mmol/L 4.5   MAGNESIUM mg/dL 1.6   CO2 mmol/L 25.6   CHLORIDE mmol/L 91*   ANION GAP mmol/L 13.4   CREATININE mg/dL 1.36*   BUN mg/dL 22   BUN / CREAT RATIO  16.2   CALCIUM mg/dL 9.4   EGFR IF NONAFRICN AM mL/min/1.73 37*   ALK PHOS U/L 48   TOTAL PROTEIN g/dL 7.4   ALT (SGPT) U/L 15   AST (SGOT) U/L 26   BILIRUBIN mg/dL 0.6   ALBUMIN g/dL 4.30   GLOBULIN gm/dL 3.1   A/G RATIO g/dL 1.4     Estimated Creatinine Clearance: 26.5 mL/min (by C-G formula based on Cr of 1.36).        Results from last 7 days  Lab Units 11/09/17  1305   WBC 10*3/mm3 5.01   RBC 10*6/mm3 3.43*   HEMOGLOBIN g/dL 12.6   HEMATOCRIT % 38.5   MCV fL 112.2*   MCH pg 36.7*   MCHC g/dL 32.7   RDW % 14.7*   RDW-SD fl 60.5*   MPV fL 10.3   PLATELETS 10*3/mm3 175   NEUTROPHIL % % 50.7   LYMPHOCYTE % % 40.5   MONOCYTES % % 7.6   EOSINOPHIL % % 1.0   BASOPHIL % % 0.2   IMM GRAN % % 0.0   NEUTROS ABS 10*3/mm3 2.54   LYMPHS ABS 10*3/mm3 2.03   MONOS ABS 10*3/mm3 0.38   EOS ABS 10*3/mm3 0.05   BASOS ABS 10*3/mm3 0.01   IMMATURE GRANS (ABS) 10*3/mm3 0.00   NRBC /100 WBC 0.0           Results from last 7 days  Lab Units 11/09/17  1305   TROPONIN T ng/mL <0.010       Results from last 7 days  Lab Units 11/09/17  1305   PROBNP pg/mL 2547.0*                 Microbiology Results (last 10 days)     ** No results found for the last 240 hours. **            Diagnostics:  Ct Abdomen Pelvis Without Contrast    Result Date: 11/9/2017  CT ABDOMEN AND PELVIS WITHOUT CONTRAST.   TECHNIQUE: Radiation dose reduction techniques were utilized, including automated exposure control and exposure modulation based on body size. A routine CT scan of the abdomen and pelvis was performed with coronal and sagittal reconstructed images.  HISTORY: Vomiting with exertion.  COMPARISON: No prior studies for comparison.  FINDINGS: Lung bases demonstrate no consolidation or effusion. Mild emphysema. Large hiatal hernia.  Liver demonstrates homogeneous parenchyma, no definite mass. Unremarkable gallbladder. No intra or extrahepatic biliary ductal dilatation.  The spleen is unremarkable. Pancreas is unremarkable, no pancreatic ductal dilatation. Adrenal glands are within normal limits.  Kidneys do not demonstrate hydronephrosis. No definite renal calculi. Urinary bladder is unremarkable.     Small and large bowel loops are within normal limits. Diverticulosis of the colon. Appendix is not visualized.  No significant retroperitoneal lymphadenopathy.          No definite acute abdominal pathology, no obstructive uropathy or inflammatory bowel disease. Appendix is not visualized. Diverticulosis of the colon.       Xr Chest Pa & Lateral    Result Date: 11/9/2017  XR CHEST PA AND LATERAL-  HISTORY: Female who is 87 years-old,  dyspnea  TECHNIQUE: Frontal and lateral views of the chest  COMPARISON: 4/23/2015  FINDINGS: Heart is enlarged. Hiatal hernia is again apparent. Aorta is tortuous. Pulmonary vasculature is unremarkable. Small likely atelectasis in the left lower lung. No pleural effusion or pneumothorax. Otherwise stable.      Small likely atelectasis left lower lung. Cardiomegaly. Tortuous aorta.  This report was finalized on 11/9/2017 1:57 PM by Dr. Vern Mitchell MD.      Results for orders placed in visit on 01/30/15   SCANNED - ECHOCARDIOGRAM       I have reviewed the CT the abdomen with cuts through the lung bases and she does have a very impressive sized hiatal hernia and a sickly her entire stomach  has herniated into her left hemithorax diaphragm is compromised its actually concave on the scans.  The right diaphragm remains complex.  There is some compressive atelectasis associated with this hiatal hernia    Assessment/Plan     1. Dyspnea with exertion progressively worsening there are multiple possible etiologies she does have some valvular heart disease probable diastolic dysfunction she has pulmonary hypertension but she also has his large hiatal hernia that is probably contributing both in limiting diaphragmatic excursion and compressive atelectasis.  Make sure there is nothing else contributing to this dyspnea I think we need to get full lung functions to evaluate probably a ventilation perfusion lung scan to rule out any chronic thromboembolic disease with her history of clots and see just how much function she has lost due to the atelectasis in that left base.  2. Very large hiatal hernia with a recheck to see how much we think her breathing might improve if this was repaired can be somewhat difficult with multiple factorial causes of dyspnea but she also has significant medical problems and surgical repair may be too risky we'll try and see how much she we think the hernia is contributing so that can be taken into account  3. Valvular heart disease  4. History of pulmonary emboli on anticoagulant rule out chronic thromboembolic disease  5. Pulmonary hypertension multiple possible etiologies evaluating echocardiogram results are pending she can get a ventilation perfusion lung scan PFTs CT scan to evaluate  6. On a kidney disease stage III  7. Chronic diastolic CHF  8. Atrial fibrillation  9. Diabetes mellitus type 2  10. Peripheral neuropathy  11. Recent bolus pemphigoid      Fareed Schwartz MD  11/10/17  9:38 AM    Time:

## 2017-11-10 NOTE — PLAN OF CARE
Problem: Patient Care Overview (Adult)  Goal: Plan of Care Review  Outcome: Ongoing (interventions implemented as appropriate)    11/10/17 0514   Coping/Psychosocial Response Interventions   Plan Of Care Reviewed With patient   Patient Care Overview   Progress improving   Outcome Evaluation   Outcome Summary/Follow up Plan A-fib w/ HR 57-93, no s/s; wheezing w/o SOA; abd CT done; no c/o pain; used BSC w/ stand-by assist, fall precaution educated; continue to monitor       Goal: Adult Individualization and Mutuality  Outcome: Ongoing (interventions implemented as appropriate)  Goal: Discharge Needs Assessment  Outcome: Ongoing (interventions implemented as appropriate)    Problem: Respiratory Insufficiency (Adult)  Goal: Identify Related Risk Factors and Signs and Symptoms  Outcome: Ongoing (interventions implemented as appropriate)  Goal: Acid/Base Balance  Outcome: Ongoing (interventions implemented as appropriate)  Goal: Effective Ventilation  Outcome: Ongoing (interventions implemented as appropriate)

## 2017-11-10 NOTE — PLAN OF CARE
Problem: Patient Care Overview (Adult)  Goal: Plan of Care Review  Outcome: Ongoing (interventions implemented as appropriate)    11/10/17 4877   Coping/Psychosocial Response Interventions   Plan Of Care Reviewed With patient   Patient Care Overview   Progress improving   Outcome Evaluation   Outcome Summary/Follow up Plan Vitals stable. no c/o pain. pulmonary test done today. Dr. Rubio to see pt, possible surgery. Sister at bedside. Will continue to monitor.        Goal: Adult Individualization and Mutuality  Outcome: Ongoing (interventions implemented as appropriate)  Goal: Discharge Needs Assessment  Outcome: Ongoing (interventions implemented as appropriate)    Problem: Respiratory Insufficiency (Adult)  Goal: Identify Related Risk Factors and Signs and Symptoms  Outcome: Ongoing (interventions implemented as appropriate)  Goal: Acid/Base Balance  Outcome: Ongoing (interventions implemented as appropriate)  Goal: Effective Ventilation  Outcome: Ongoing (interventions implemented as appropriate)

## 2017-11-10 NOTE — PROGRESS NOTES
"  Internal Medicine Note    Uzma Jerome  87 y.o.      Chief Complaint:  No chief complaint on file.      Subjective       Patient Complaints: Feeling better today.  She is at rest.  I missed her initially on rounds when she was getting her TTE.  Saw her later.  He was eating lunch and tolerating it well.    Objective     Vital Signs  Temp:  [97.6 °F (36.4 °C)-98.9 °F (37.2 °C)] 97.6 °F (36.4 °C)  Heart Rate:  [62-75] 64  Resp:  [16-18] 18  BP: (126-140)/(68-82) 140/82    Flowsheet Rows         First Filed Value    Admission Height  60\" (152.4 cm) Documented at 11/09/2017 1233    Admission Weight  168 lb 12.8 oz (76.6 kg) Documented at 11/09/2017 1445        167 lb 6.4 oz (75.9 kg)    Intake/Output Summary (Last 24 hours) at 11/10/17 1707  Last data filed at 11/10/17 1323   Gross per 24 hour   Intake              720 ml   Output             1050 ml   Net             -330 ml       Physical Exam:   General Appearance:    Alert, cooperative, in no acute distress       Eyes:            Conjunctivae and sclerae normal, no   icterus, PERRLA   Neck:   No adenopathy, supple, trachea midline, no thyromegaly, no   carotid bruit, no JVD       Lungs:     Clear to auscultation,respirations regular, even and                  unlabored    Heart:    Regular rhythm and normal rate, normal S1 and S2, no            murmur, no gallop, no rub, no click       Abdomen:     Normal bowel sounds, no masses, no organomegaly, soft        non-tender, non-distended, no guarding, no rebound                tenderness       Extremities:   Moves all extremities well, no edema, no cyanosis   Pulses:   Pulses palpable and equal bilaterally       Lymph nodes:   No palpable adenopathy        Results Review:     I reviewed the patient's new clinical results.  Discussed with Radiologist and Dr. Schwartz and Dr. Medina and Dr. Ibrahim        Assessment/Plan     Active Problems:    Dyspnea  Pulmonary artery hypertension related to distant PE  Abnormal " VQ scan consistent with the defect from her hiatal hernia  Chronic atrial fibrillation  Hypertension  History of bullous pemphigoid which is been well controlled with chronic low-dose methotrexate  MGUS  History of gout  Restless leg syndrome    She has a puzzling history of nausea and vomiting upon minimal exertion for the past 2 months.  Interestingly enough the CT of abdomen and pelvis and chest demonstrated massive hiatal hernia with evidence of fall for utilized segments of colon within the hiatal hernia that show evidence of chronicity.  I suspect she is intermittently having volvulus related symptoms with exercise.  I was initially concerned that the massive hiatal hernia by itself dosing her problems and also some respiratory compromise.  Think there is very little doubt that we need to proceed with surgery now that the volvulus has been identified.  I've talked with patient and family and they are in agreement with proceeding.  Dr. Medina is to see the patient and the situation and see whether this will require an abdominal approach or a thoracic approach.  We can get thoracic surgery involved if need be with Dr. Katz.  I am optimistic that her chronic respiratory status will be distinctly improved with the hiatal hernia and diaphragm repair.  Appreciate the input from Dr. Schwartz and Dr. Ibrahim.    Paramjit Pennington MD  11/10/17  5:07 PM    Dragon disclaimer:   Much of this encounter note is an electronic transcription/translation of spoken language to printed text. The electronic translation of spoken language may permit erroneous, or at times, nonsensical words or phrases to be inadvertently transcribed; Although I have reviewed the note for such errors, some may still exist.

## 2017-11-11 PROBLEM — K44.9 PARAESOPHAGEAL HERNIA: Status: ACTIVE | Noted: 2017-11-11

## 2017-11-11 PROBLEM — E87.1 HYPONATREMIA: Status: ACTIVE | Noted: 2017-11-11

## 2017-11-11 PROCEDURE — 94799 UNLISTED PULMONARY SVC/PX: CPT

## 2017-11-11 RX ORDER — SODIUM CHLORIDE 9 MG/ML
50 INJECTION, SOLUTION INTRAVENOUS CONTINUOUS
Status: DISCONTINUED | OUTPATIENT
Start: 2017-11-11 | End: 2017-11-12 | Stop reason: HOSPADM

## 2017-11-11 RX ORDER — ALLOPURINOL 300 MG/1
150 TABLET ORAL DAILY
Status: DISCONTINUED | OUTPATIENT
Start: 2017-11-12 | End: 2017-11-12 | Stop reason: HOSPADM

## 2017-11-11 RX ADMIN — METOPROLOL TARTRATE 25 MG: 25 TABLET ORAL at 08:10

## 2017-11-11 RX ADMIN — FOLIC ACID 5 MG: 1 TABLET ORAL at 08:10

## 2017-11-11 RX ADMIN — PANTOPRAZOLE SODIUM 40 MG: 40 TABLET, DELAYED RELEASE ORAL at 07:25

## 2017-11-11 RX ADMIN — BUDESONIDE 0.5 MG: 0.5 INHALANT RESPIRATORY (INHALATION) at 08:07

## 2017-11-11 RX ADMIN — MONTELUKAST 10 MG: 10 TABLET, FILM COATED ORAL at 08:10

## 2017-11-11 RX ADMIN — SODIUM CHLORIDE 50 ML/HR: 9 INJECTION, SOLUTION INTRAVENOUS at 15:08

## 2017-11-11 RX ADMIN — ALLOPURINOL 300 MG: 300 TABLET ORAL at 08:11

## 2017-11-11 RX ADMIN — ROPINIROLE HYDROCHLORIDE 0.5 MG: 0.5 TABLET, FILM COATED ORAL at 21:52

## 2017-11-11 RX ADMIN — PANTOPRAZOLE SODIUM 40 MG: 40 TABLET, DELAYED RELEASE ORAL at 17:30

## 2017-11-11 RX ADMIN — BUDESONIDE 0.5 MG: 0.5 INHALANT RESPIRATORY (INHALATION) at 20:10

## 2017-11-11 RX ADMIN — METOPROLOL TARTRATE 25 MG: 25 TABLET ORAL at 21:52

## 2017-11-11 NOTE — PROGRESS NOTES
" LOS: 0 days   Primary Care Physician: No primary care provider on file.     Subjective  She had multiple episodes of dyspnea and cough with fatigue and vomiting.  This is increased dramatically over the last couple of weeks.  She denies any chest pain.  The more the documents and suggested she was diabetic; she is not.  No previous history of obstructive sleep apnea or COPD.  Feels better today.  There has been periods of some dysphagia and early satiety with occasional episodes of vomiting.  She denies stricture symptoms.    Vital Signs  Body mass index is 32.69 kg/(m^2).  Temp:  [97.5 °F (36.4 °C)-98 °F (36.7 °C)] 97.9 °F (36.6 °C)  Heart Rate:  [61-85] 61  Resp:  [16-18] 16  BP: (130-140)/(70-82) 140/70      Objective:  Vital signs: (most recent): Blood pressure 125/75, pulse 61, temperature 97.6 °F (36.4 °C), temperature source Oral, resp. rate 18, height 60\" (152.4 cm), weight 167 lb 6.4 oz (75.9 kg), SpO2 99 %.    Lungs:  Normal respiratory rate and normal effort.  There are decreased breath sounds (Left base).  No wheezes or rhonchi.    Heart: Irregular rhythm.  S1 normal and S2 normal.  Positive for murmur (grade 2/6 systolic murmur at the apex without radiation).    Chest: (Increased AP diameter with thoracic spine kyphosis)  Abdomen: Abdomen is soft.  Bowel sounds are normal.   There is no abdominal tenderness.                 Results Review:    I reviewed the patient's new clinical results.      Results from last 7 days  Lab Units 11/09/17  1305   WBC 10*3/mm3 5.01   HEMOGLOBIN g/dL 12.6   PLATELETS 10*3/mm3 175       Results from last 7 days  Lab Units 11/09/17  1305   SODIUM mmol/L 130*   POTASSIUM mmol/L 4.5   CHLORIDE mmol/L 91*   CO2 mmol/L 25.6   BUN mg/dL 22   CREATININE mg/dL 1.36*   CALCIUM mg/dL 9.4   GLUCOSE mg/dL 105*         Hemoglobin A1C:No results found for: HGBA1C    Glucose Range:No results found for: POCGLU    Medication Review: Yes    Physical Therapy:    Assessment/Plan     Active " Hospital Problems (** Indicates Principal Problem)    Diagnosis Date Noted   • 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       Assessment & Plan  Paraesophageal hernia: The chest showed a huge hiatal hernia with the entire stomach body and tail of the pancreas as well as splenic flexure of the colon in the chest with significant atelectasis of left lower lobe.  Scheduled surgery in a.m. per Dr. Medina    Dyspnea: I clearly atelectasis related to the large hiatal hernia, seen by Dr. Schwartz who recommended PFTs and a VQ scan.  The VQ showed low probability for PE but shows decreased perfusion and ventilation at the left hemidiaphragm consistent with compressive atelectasis from the large hiatal hernia. The secondary pulmonary hypertension may be related to her thoracic kyphosis.  ABGs today prior to surgery.  May also have VINOD and consider an overnight oximetry study.    Chronic atrial fibrillation with Mitral regurgitation severe tricuspid regurgitation and secondary pulmonary hypertension    Previous pulmonary embolus on Xarelto on 2 mg a day due to excessive bruising    Hypertension and chronic kidney disease: Stable      Hyponatremia and hypochloremia: New since 9/17.  Will give IV fluids    History of gout: Normally on Zyloprim 300 mg a day    Recent bullous pemphigoid: Methotrexate prescribed for this        Disposition:    Le Reid MD  11/11/17  11:43 AM

## 2017-11-11 NOTE — PLAN OF CARE
Problem: Patient Care Overview (Adult)  Goal: Plan of Care Review  Outcome: Ongoing (interventions implemented as appropriate)    11/11/17 1703   Coping/Psychosocial Response Interventions   Plan Of Care Reviewed With patient   Patient Care Overview   Progress improving   Outcome Evaluation   Outcome Summary/Follow up Plan VSS. Patient ambulating in the room with no issues. Hiatal hernia repair tomorrow am at 0730. No c/o pain. Will continue to monitor.       Goal: Adult Individualization and Mutuality  Outcome: Ongoing (interventions implemented as appropriate)  Goal: Discharge Needs Assessment  Outcome: Ongoing (interventions implemented as appropriate)    Problem: Respiratory Insufficiency (Adult)  Goal: Identify Related Risk Factors and Signs and Symptoms  Outcome: Ongoing (interventions implemented as appropriate)  Goal: Acid/Base Balance  Outcome: Ongoing (interventions implemented as appropriate)  Goal: Effective Ventilation  Outcome: Ongoing (interventions implemented as appropriate)    Problem: Perioperative Period (Adult)  Goal: Signs and Symptoms of Listed Potential Problems Will be Absent or Manageable (Perioperative Period)  Outcome: Ongoing (interventions implemented as appropriate)

## 2017-11-11 NOTE — PLAN OF CARE
Problem: Patient Care Overview (Adult)  Goal: Plan of Care Review  Outcome: Ongoing (interventions implemented as appropriate)    11/10/17 1842 11/10/17 2023 11/11/17 0517   Coping/Psychosocial Response Interventions   Plan Of Care Reviewed With --  patient --    Patient Care Overview   Progress improving --  --    Outcome Evaluation   Outcome Summary/Follow up Plan --  --  VSS; no c/o pain, Surgery is planned for 11/12 so holding Xeralto till after the surgery; will continue to monitor;        Goal: Adult Individualization and Mutuality  Outcome: Ongoing (interventions implemented as appropriate)  Goal: Discharge Needs Assessment  Outcome: Ongoing (interventions implemented as appropriate)    Problem: Respiratory Insufficiency (Adult)  Goal: Identify Related Risk Factors and Signs and Symptoms  Outcome: Ongoing (interventions implemented as appropriate)  Goal: Acid/Base Balance  Outcome: Ongoing (interventions implemented as appropriate)  Goal: Effective Ventilation  Outcome: Ongoing (interventions implemented as appropriate)    Problem: Perioperative Period (Adult)  Goal: Signs and Symptoms of Listed Potential Problems Will be Absent or Manageable (Perioperative Period)  Outcome: Ongoing (interventions implemented as appropriate)

## 2017-11-11 NOTE — PROGRESS NOTES
Scheduled for laparoscopic paraesophageal hiatal hernia repair tomorrow at 07:30.  Discussed procedure with her again today, had no further questions, wishes to proceed.

## 2017-11-11 NOTE — PROGRESS NOTES
Daily Progress Note.     11/11/2017    Uzma Jerome ,  87 y.o. ,female  [facility]      Brief problem (summary)  · SOB  · Large hiatal hernia for repair  · Hx of PE  · ?VINOD  · Kyphosis     Today, feels better   Surgery planned for AM    PMS/FH/SH: reviewed and unchanged.  Medications:     [START ON 11/12/2017] allopurinol 150 mg Oral Daily   budesonide 0.5 mg Nebulization BID - RT   docusate sodium 100 mg Oral BID   folic acid 5 mg Oral Daily   metoprolol tartrate 25 mg Oral Q12H   montelukast 10 mg Oral Daily   pantoprazole 40 mg Oral BID AC   rOPINIRole 0.5 mg Oral Nightly       sodium chloride 50 mL/hr Last Rate: 50 mL/hr (11/11/17 1508)       ROS:  Respiratory ROS: positive for - shortness of breath    Objective  Temp:  [97.5 °F (36.4 °C)-98 °F (36.7 °C)] 97.6 °F (36.4 °C)  Heart Rate:  [61-85] 61  Resp:  [16-18] 18  BP: (125-140)/(70-80) 125/75  I/O last 3 completed shifts:  In: 1440 [P.O.:1440]  Out: 1050 [Urine:1050]       Physical Exam   Constitutional: She is oriented to person, place, and time. She appears well-developed and well-nourished. No distress.   HENT:   Head: Normocephalic and atraumatic.   Eyes: Pupils are equal, round, and reactive to light. No scleral icterus.   Cardiovascular: Normal rate and regular rhythm.    Pulmonary/Chest: Effort normal. No respiratory distress. She has decreased breath sounds in the left lower field. She has no wheezes.   Abdominal: Soft. Bowel sounds are normal. There is no hepatosplenomegaly.   Neurological: She is alert and oriented to person, place, and time.   Skin: No cyanosis. Nails show no clubbing.   Psychiatric: She has a normal mood and affect. Her behavior is normal.       Results from last 7 days  Lab Units 11/09/17  1305   WBC 10*3/mm3 5.01   HEMOGLOBIN g/dL 12.6   HEMATOCRIT % 38.5   PLATELETS 10*3/mm3 175       Results from last 7 days  Lab Units 11/09/17  1305   SODIUM mmol/L 130*   POTASSIUM mmol/L 4.5   CHLORIDE mmol/L 91*    CO2 mmol/L 25.6   BUN mg/dL 22   CREATININE mg/dL 1.36*   GLUCOSE mg/dL 105*   CALCIUM mg/dL 9.4               ASSESSMENT/ PLAN:  · Dyspnea, most likely due multiple factors especially large hiatal hernia   · Hx of PE, no evidence of chronic thromboembolic disease  · CKD  · A fib  · DM        Darius Lopez MD, [facility]  Pulmonary, Critical Care and Sleep Medicine.  [facility]    11/11/2017 ,    Much of this encounter note is an electronic transcription/translation of spoken language to printed text. The electronic translation of spoken language may permit erroneous, or at times, nonsensical words or phrases to be inadvertently transcribed; Although I have reviewed the note for such errors, some may still exist.

## 2017-11-11 NOTE — THERAPY TREATMENT NOTE
Dr. Reid had ordered a ABG to be done on the patient. Patient and family in the room with her did NOT want her to have ABG done.  Spoke with RN caring for patient and notified her.     Thank you   Penny Encinas RRT

## 2017-11-12 ENCOUNTER — ANESTHESIA EVENT (OUTPATIENT)
Dept: PERIOP | Facility: HOSPITAL | Age: 82
End: 2017-11-12

## 2017-11-12 ENCOUNTER — ANESTHESIA (OUTPATIENT)
Dept: PERIOP | Facility: HOSPITAL | Age: 82
End: 2017-11-12

## 2017-11-12 LAB
ALBUMIN SERPL-MCNC: 3.5 G/DL (ref 3.5–5.2)
ANION GAP SERPL CALCULATED.3IONS-SCNC: 10.6 MMOL/L
BUN BLD-MCNC: 26 MG/DL (ref 8–23)
BUN/CREAT SERPL: 26.3 (ref 7–25)
CALCIUM SPEC-SCNC: 8.7 MG/DL (ref 8.6–10.5)
CHLORIDE SERPL-SCNC: 102 MMOL/L (ref 98–107)
CO2 SERPL-SCNC: 25.4 MMOL/L (ref 22–29)
CREAT BLD-MCNC: 0.99 MG/DL (ref 0.57–1)
GFR SERPL CREATININE-BSD FRML MDRD: 53 ML/MIN/1.73
GLUCOSE BLD-MCNC: 97 MG/DL (ref 65–99)
GLUCOSE BLDC GLUCOMTR-MCNC: 88 MG/DL (ref 70–130)
MAGNESIUM SERPL-MCNC: 1.5 MG/DL (ref 1.6–2.4)
MAGNESIUM SERPL-MCNC: 1.6 MG/DL (ref 1.6–2.4)
PHOSPHATE SERPL-MCNC: 3.9 MG/DL (ref 2.5–4.5)
POTASSIUM BLD-SCNC: 4.1 MMOL/L (ref 3.5–5.2)
SODIUM BLD-SCNC: 138 MMOL/L (ref 136–145)

## 2017-11-12 PROCEDURE — 25010000002 MIDAZOLAM PER 1 MG: Performed by: ANESTHESIOLOGY

## 2017-11-12 PROCEDURE — C1781 MESH (IMPLANTABLE): HCPCS | Performed by: SURGERY

## 2017-11-12 PROCEDURE — 25010000002 ONDANSETRON PER 1 MG: Performed by: NURSE ANESTHETIST, CERTIFIED REGISTERED

## 2017-11-12 PROCEDURE — 25010000002 FENTANYL CITRATE (PF) 100 MCG/2ML SOLUTION: Performed by: NURSE ANESTHETIST, CERTIFIED REGISTERED

## 2017-11-12 PROCEDURE — 25010000002 PROPOFOL 10 MG/ML EMULSION: Performed by: NURSE ANESTHETIST, CERTIFIED REGISTERED

## 2017-11-12 PROCEDURE — 25010000002 MAGNESIUM SULFATE IN D5W 1G/100ML (PREMIX) 1-5 GM/100ML-% SOLUTION: Performed by: HOSPITALIST

## 2017-11-12 PROCEDURE — 0BUT4JZ SUPPLEMENT DIAPHRAGM WITH SYNTHETIC SUBSTITUTE, PERCUTANEOUS ENDOSCOPIC APPROACH: ICD-10-PCS | Performed by: SURGERY

## 2017-11-12 PROCEDURE — 25010000002 HYDROMORPHONE PER 4 MG: Performed by: SURGERY

## 2017-11-12 PROCEDURE — 43282 LAP PARAESOPH HER RPR W/MESH: CPT | Performed by: SURGERY

## 2017-11-12 PROCEDURE — 25010000002 DEXAMETHASONE PER 1 MG: Performed by: NURSE ANESTHETIST, CERTIFIED REGISTERED

## 2017-11-12 PROCEDURE — 25010000002 FENTANYL CITRATE (PF) 100 MCG/2ML SOLUTION: Performed by: ANESTHESIOLOGY

## 2017-11-12 PROCEDURE — 83735 ASSAY OF MAGNESIUM: CPT | Performed by: HOSPITALIST

## 2017-11-12 PROCEDURE — 43282 LAP PARAESOPH HER RPR W/MESH: CPT | Performed by: PHYSICIAN ASSISTANT

## 2017-11-12 PROCEDURE — 80069 RENAL FUNCTION PANEL: CPT | Performed by: HOSPITALIST

## 2017-11-12 PROCEDURE — 83735 ASSAY OF MAGNESIUM: CPT | Performed by: INTERNAL MEDICINE

## 2017-11-12 PROCEDURE — 82962 GLUCOSE BLOOD TEST: CPT

## 2017-11-12 PROCEDURE — 25010000003 CEFAZOLIN IN DEXTROSE 2-4 GM/100ML-% SOLUTION: Performed by: SURGERY

## 2017-11-12 PROCEDURE — 25010000002 PHENYLEPHRINE PER 1 ML: Performed by: NURSE ANESTHETIST, CERTIFIED REGISTERED

## 2017-11-12 DEVICE — MESH TISS REINFORCEMENT BIO/A 7X10CM: Type: IMPLANTABLE DEVICE | Status: FUNCTIONAL

## 2017-11-12 DEVICE — SEALANT WND FIBRIN TISSEEL VAPOR/HEAT/PREFIL/SYR 10ML: Type: IMPLANTABLE DEVICE | Status: FUNCTIONAL

## 2017-11-12 RX ORDER — MIDAZOLAM HYDROCHLORIDE 1 MG/ML
1 INJECTION INTRAMUSCULAR; INTRAVENOUS
Status: DISCONTINUED | OUTPATIENT
Start: 2017-11-12 | End: 2017-11-12 | Stop reason: HOSPADM

## 2017-11-12 RX ORDER — ROCURONIUM BROMIDE 10 MG/ML
INJECTION, SOLUTION INTRAVENOUS AS NEEDED
Status: DISCONTINUED | OUTPATIENT
Start: 2017-11-12 | End: 2017-11-12 | Stop reason: SURG

## 2017-11-12 RX ORDER — HYDROMORPHONE HYDROCHLORIDE 1 MG/ML
0.5 INJECTION, SOLUTION INTRAMUSCULAR; INTRAVENOUS; SUBCUTANEOUS
Status: DISCONTINUED | OUTPATIENT
Start: 2017-11-12 | End: 2017-11-12

## 2017-11-12 RX ORDER — BUPIVACAINE HYDROCHLORIDE AND EPINEPHRINE 5; 5 MG/ML; UG/ML
INJECTION, SOLUTION PERINEURAL AS NEEDED
Status: DISCONTINUED | OUTPATIENT
Start: 2017-11-12 | End: 2017-11-12 | Stop reason: HOSPADM

## 2017-11-12 RX ORDER — FAMOTIDINE 10 MG/ML
20 INJECTION, SOLUTION INTRAVENOUS ONCE
Status: COMPLETED | OUTPATIENT
Start: 2017-11-12 | End: 2017-11-12

## 2017-11-12 RX ORDER — NALOXONE HCL 0.4 MG/ML
0.1 VIAL (ML) INJECTION
Status: DISCONTINUED | OUTPATIENT
Start: 2017-11-12 | End: 2017-11-14 | Stop reason: HOSPADM

## 2017-11-12 RX ORDER — HYDROCODONE BITARTRATE AND ACETAMINOPHEN 7.5; 325 MG/1; MG/1
1 TABLET ORAL ONCE AS NEEDED
Status: DISCONTINUED | OUTPATIENT
Start: 2017-11-12 | End: 2017-11-12

## 2017-11-12 RX ORDER — ONDANSETRON 2 MG/ML
INJECTION INTRAMUSCULAR; INTRAVENOUS AS NEEDED
Status: DISCONTINUED | OUTPATIENT
Start: 2017-11-12 | End: 2017-11-12 | Stop reason: SURG

## 2017-11-12 RX ORDER — ONDANSETRON 2 MG/ML
4 INJECTION INTRAMUSCULAR; INTRAVENOUS EVERY 4 HOURS PRN
Status: DISCONTINUED | OUTPATIENT
Start: 2017-11-12 | End: 2017-11-14 | Stop reason: HOSPADM

## 2017-11-12 RX ORDER — FENTANYL CITRATE 50 UG/ML
50 INJECTION, SOLUTION INTRAMUSCULAR; INTRAVENOUS
Status: DISCONTINUED | OUTPATIENT
Start: 2017-11-12 | End: 2017-11-12

## 2017-11-12 RX ORDER — SODIUM CHLORIDE 0.9 % (FLUSH) 0.9 %
1-10 SYRINGE (ML) INJECTION AS NEEDED
Status: DISCONTINUED | OUTPATIENT
Start: 2017-11-12 | End: 2017-11-12 | Stop reason: HOSPADM

## 2017-11-12 RX ORDER — FENTANYL CITRATE 50 UG/ML
INJECTION, SOLUTION INTRAMUSCULAR; INTRAVENOUS
Status: DISPENSED
Start: 2017-11-12 | End: 2017-11-12

## 2017-11-12 RX ORDER — LABETALOL HYDROCHLORIDE 5 MG/ML
5 INJECTION, SOLUTION INTRAVENOUS
Status: DISCONTINUED | OUTPATIENT
Start: 2017-11-12 | End: 2017-11-12

## 2017-11-12 RX ORDER — HYDRALAZINE HYDROCHLORIDE 20 MG/ML
5 INJECTION INTRAMUSCULAR; INTRAVENOUS
Status: DISCONTINUED | OUTPATIENT
Start: 2017-11-12 | End: 2017-11-12

## 2017-11-12 RX ORDER — FENTANYL CITRATE 50 UG/ML
50 INJECTION, SOLUTION INTRAMUSCULAR; INTRAVENOUS
Status: COMPLETED | OUTPATIENT
Start: 2017-11-12 | End: 2017-11-12

## 2017-11-12 RX ORDER — MAGNESIUM HYDROXIDE 1200 MG/15ML
LIQUID ORAL AS NEEDED
Status: DISCONTINUED | OUTPATIENT
Start: 2017-11-12 | End: 2017-11-12 | Stop reason: HOSPADM

## 2017-11-12 RX ORDER — HYDROMORPHONE HYDROCHLORIDE 1 MG/ML
0.5 INJECTION, SOLUTION INTRAMUSCULAR; INTRAVENOUS; SUBCUTANEOUS
Status: DISCONTINUED | OUTPATIENT
Start: 2017-11-12 | End: 2017-11-14 | Stop reason: HOSPADM

## 2017-11-12 RX ORDER — MIDAZOLAM HYDROCHLORIDE 1 MG/ML
2 INJECTION INTRAMUSCULAR; INTRAVENOUS
Status: DISCONTINUED | OUTPATIENT
Start: 2017-11-12 | End: 2017-11-12 | Stop reason: HOSPADM

## 2017-11-12 RX ORDER — OXYCODONE AND ACETAMINOPHEN 7.5; 325 MG/1; MG/1
1 TABLET ORAL ONCE AS NEEDED
Status: DISCONTINUED | OUTPATIENT
Start: 2017-11-12 | End: 2017-11-12

## 2017-11-12 RX ORDER — MONTELUKAST SODIUM 10 MG/1
10 TABLET ORAL DAILY
Status: DISCONTINUED | OUTPATIENT
Start: 2017-11-12 | End: 2017-11-12 | Stop reason: HOSPADM

## 2017-11-12 RX ORDER — DEXTROSE, SODIUM CHLORIDE, AND POTASSIUM CHLORIDE 5; .45; .15 G/100ML; G/100ML; G/100ML
75 INJECTION INTRAVENOUS CONTINUOUS
Status: DISCONTINUED | OUTPATIENT
Start: 2017-11-12 | End: 2017-11-13

## 2017-11-12 RX ORDER — DIPHENHYDRAMINE HYDROCHLORIDE 50 MG/ML
12.5 INJECTION INTRAMUSCULAR; INTRAVENOUS
Status: DISCONTINUED | OUTPATIENT
Start: 2017-11-12 | End: 2017-11-12

## 2017-11-12 RX ORDER — ONDANSETRON 2 MG/ML
4 INJECTION INTRAMUSCULAR; INTRAVENOUS ONCE AS NEEDED
Status: COMPLETED | OUTPATIENT
Start: 2017-11-12 | End: 2017-11-12

## 2017-11-12 RX ORDER — EPHEDRINE SULFATE 50 MG/ML
INJECTION, SOLUTION INTRAVENOUS AS NEEDED
Status: DISCONTINUED | OUTPATIENT
Start: 2017-11-12 | End: 2017-11-12 | Stop reason: SURG

## 2017-11-12 RX ORDER — PROPOFOL 10 MG/ML
VIAL (ML) INTRAVENOUS AS NEEDED
Status: DISCONTINUED | OUTPATIENT
Start: 2017-11-12 | End: 2017-11-12 | Stop reason: SURG

## 2017-11-12 RX ORDER — FLUMAZENIL 0.1 MG/ML
0.2 INJECTION INTRAVENOUS AS NEEDED
Status: DISCONTINUED | OUTPATIENT
Start: 2017-11-12 | End: 2017-11-12

## 2017-11-12 RX ORDER — DEXAMETHASONE SODIUM PHOSPHATE 10 MG/ML
INJECTION INTRAMUSCULAR; INTRAVENOUS AS NEEDED
Status: DISCONTINUED | OUTPATIENT
Start: 2017-11-12 | End: 2017-11-12 | Stop reason: SURG

## 2017-11-12 RX ORDER — TRAMADOL HYDROCHLORIDE 50 MG/1
50 TABLET ORAL EVERY 6 HOURS PRN
Status: DISCONTINUED | OUTPATIENT
Start: 2017-11-12 | End: 2017-11-14 | Stop reason: HOSPADM

## 2017-11-12 RX ORDER — MAGNESIUM SULFATE 1 G/100ML
2 INJECTION INTRAVENOUS ONCE
Status: COMPLETED | OUTPATIENT
Start: 2017-11-12 | End: 2017-11-12

## 2017-11-12 RX ORDER — CEFAZOLIN SODIUM 2 G/100ML
2 INJECTION, SOLUTION INTRAVENOUS ONCE
Status: COMPLETED | OUTPATIENT
Start: 2017-11-12 | End: 2017-11-12

## 2017-11-12 RX ORDER — SODIUM CHLORIDE, SODIUM LACTATE, POTASSIUM CHLORIDE, CALCIUM CHLORIDE 600; 310; 30; 20 MG/100ML; MG/100ML; MG/100ML; MG/100ML
9 INJECTION, SOLUTION INTRAVENOUS CONTINUOUS
Status: DISCONTINUED | OUTPATIENT
Start: 2017-11-12 | End: 2017-11-12 | Stop reason: HOSPADM

## 2017-11-12 RX ORDER — NALOXONE HCL 0.4 MG/ML
0.2 VIAL (ML) INJECTION AS NEEDED
Status: DISCONTINUED | OUTPATIENT
Start: 2017-11-12 | End: 2017-11-12

## 2017-11-12 RX ADMIN — ONDANSETRON 4 MG: 2 INJECTION INTRAMUSCULAR; INTRAVENOUS at 09:59

## 2017-11-12 RX ADMIN — FENTANYL CITRATE 50 MCG: 50 INJECTION, SOLUTION INTRAMUSCULAR; INTRAVENOUS at 09:34

## 2017-11-12 RX ADMIN — FENTANYL CITRATE 50 MCG: 50 INJECTION INTRAMUSCULAR; INTRAVENOUS at 10:09

## 2017-11-12 RX ADMIN — DEXAMETHASONE SODIUM PHOSPHATE 8 MG: 10 INJECTION INTRAMUSCULAR; INTRAVENOUS at 07:46

## 2017-11-12 RX ADMIN — ONDANSETRON 4 MG: 2 INJECTION INTRAMUSCULAR; INTRAVENOUS at 08:33

## 2017-11-12 RX ADMIN — SODIUM CHLORIDE, POTASSIUM CHLORIDE, SODIUM LACTATE AND CALCIUM CHLORIDE 9 ML/HR: 600; 310; 30; 20 INJECTION, SOLUTION INTRAVENOUS at 07:22

## 2017-11-12 RX ADMIN — HYDROMORPHONE HYDROCHLORIDE 0.5 MG: 1 INJECTION, SOLUTION INTRAMUSCULAR; INTRAVENOUS; SUBCUTANEOUS at 23:09

## 2017-11-12 RX ADMIN — POTASSIUM CHLORIDE, DEXTROSE MONOHYDRATE AND SODIUM CHLORIDE 75 ML/HR: 150; 5; 450 INJECTION, SOLUTION INTRAVENOUS at 12:38

## 2017-11-12 RX ADMIN — METOPROLOL TARTRATE 25 MG: 25 TABLET ORAL at 21:59

## 2017-11-12 RX ADMIN — PHENYLEPHRINE HYDROCHLORIDE 50 MCG: 10 INJECTION INTRAVENOUS at 09:14

## 2017-11-12 RX ADMIN — CEFAZOLIN SODIUM 2 G: 2 INJECTION, SOLUTION INTRAVENOUS at 08:03

## 2017-11-12 RX ADMIN — FENTANYL CITRATE 100 MCG: 50 INJECTION, SOLUTION INTRAMUSCULAR; INTRAVENOUS at 07:43

## 2017-11-12 RX ADMIN — PHENYLEPHRINE HYDROCHLORIDE 60 MCG: 10 INJECTION INTRAVENOUS at 07:46

## 2017-11-12 RX ADMIN — SUGAMMADEX 160 MG: 100 INJECTION, SOLUTION INTRAVENOUS at 09:29

## 2017-11-12 RX ADMIN — HYDROMORPHONE HYDROCHLORIDE 0.5 MG: 1 INJECTION, SOLUTION INTRAMUSCULAR; INTRAVENOUS; SUBCUTANEOUS at 15:31

## 2017-11-12 RX ADMIN — FENTANYL CITRATE 50 MCG: 50 INJECTION INTRAMUSCULAR; INTRAVENOUS at 09:52

## 2017-11-12 RX ADMIN — ROCURONIUM BROMIDE 10 MG: 10 INJECTION INTRAVENOUS at 08:19

## 2017-11-12 RX ADMIN — SODIUM CHLORIDE, POTASSIUM CHLORIDE, SODIUM LACTATE AND CALCIUM CHLORIDE 9 ML/HR: 600; 310; 30; 20 INJECTION, SOLUTION INTRAVENOUS at 10:10

## 2017-11-12 RX ADMIN — EPHEDRINE SULFATE 5 MG: 50 INJECTION INTRAMUSCULAR; INTRAVENOUS; SUBCUTANEOUS at 07:57

## 2017-11-12 RX ADMIN — FENTANYL CITRATE 100 MCG: 50 INJECTION, SOLUTION INTRAMUSCULAR; INTRAVENOUS at 09:31

## 2017-11-12 RX ADMIN — PROPOFOL 80 MG: 10 INJECTION, EMULSION INTRAVENOUS at 07:46

## 2017-11-12 RX ADMIN — HYDROMORPHONE HYDROCHLORIDE 0.5 MG: 1 INJECTION, SOLUTION INTRAMUSCULAR; INTRAVENOUS; SUBCUTANEOUS at 17:56

## 2017-11-12 RX ADMIN — ROCURONIUM BROMIDE 40 MG: 10 INJECTION INTRAVENOUS at 07:46

## 2017-11-12 RX ADMIN — MAGNESIUM SULFATE HEPTAHYDRATE 2 G: 1 INJECTION, SOLUTION INTRAVENOUS at 17:53

## 2017-11-12 RX ADMIN — EPHEDRINE SULFATE 10 MG: 50 INJECTION INTRAMUSCULAR; INTRAVENOUS; SUBCUTANEOUS at 08:28

## 2017-11-12 RX ADMIN — MIDAZOLAM 1 MG: 1 INJECTION INTRAMUSCULAR; INTRAVENOUS at 07:24

## 2017-11-12 RX ADMIN — PHENYLEPHRINE HYDROCHLORIDE 100 MCG: 10 INJECTION INTRAVENOUS at 08:47

## 2017-11-12 RX ADMIN — FENTANYL CITRATE 50 MCG: 50 INJECTION INTRAMUSCULAR; INTRAVENOUS at 10:53

## 2017-11-12 RX ADMIN — FAMOTIDINE 20 MG: 10 INJECTION INTRAVENOUS at 07:23

## 2017-11-12 RX ADMIN — METOPROLOL TARTRATE 25 MG: 25 TABLET ORAL at 06:13

## 2017-11-12 NOTE — PLAN OF CARE
Problem: Patient Care Overview (Adult)  Goal: Plan of Care Review  Outcome: Ongoing (interventions implemented as appropriate)    11/12/17 9169   Coping/Psychosocial Response Interventions   Plan Of Care Reviewed With patient   Patient Care Overview   Progress improving   Outcome Evaluation   Outcome Summary/Follow up Plan Pt had laparoscopic hiatal hernia repair today. Doing well post-op. VSS. BPs a little elevated but normal with manual BP cuff. Patient tolerating liquids and pain seems to be well controlled. Receiving IV Dilaudid for pain. Ambulated to the bathroom with no issues. Will continue to monitor.       Goal: Adult Individualization and Mutuality  Outcome: Ongoing (interventions implemented as appropriate)  Goal: Discharge Needs Assessment  Outcome: Ongoing (interventions implemented as appropriate)    Problem: Respiratory Insufficiency (Adult)  Goal: Acid/Base Balance  Outcome: Ongoing (interventions implemented as appropriate)  Goal: Effective Ventilation  Outcome: Ongoing (interventions implemented as appropriate)    Problem: Perioperative Period (Adult)  Goal: Signs and Symptoms of Listed Potential Problems Will be Absent or Manageable (Perioperative Period)  Outcome: Ongoing (interventions implemented as appropriate)  Goal: Signs and Symptoms of Listed Potential Problems Will be Absent or Manageable (Perioperative Period)  Outcome: Ongoing (interventions implemented as appropriate)

## 2017-11-12 NOTE — PROGRESS NOTES
Daily Progress Note.     11/12/2017    Uzma Jeroem ,  87 y.o. ,female  [facility]      Brief problem (summary)  · SOB  · Large hiatal hernia, S/p Laparoscopic paraesophageal hiatal hernia repair with mesh (11/12/2017)  · Hx of PE  · ?VINOD  · Kyphosis     Today, sleepy after the surgery and she is arousable     PMS/FH/SH: reviewed and unchanged.  Medications:     [START ON 11/13/2017] enoxaparin 30 mg Subcutaneous Daily   fentaNYL          dextrose 5 % and sodium chloride 0.45 % with KCl 20 mEq/L 75 mL/hr       ROS:  Respiratory ROS: positive for - shortness of breath    Objective  Temp:  [97.6 °F (36.4 °C)-98.4 °F (36.9 °C)] 97.7 °F (36.5 °C)  Heart Rate:  [53-88] 74  Resp:  [12-20] 12  BP: (125-178)/() 153/90  I/O last 3 completed shifts:  In: 600 [I.V.:600]  Out: -   I/O this shift:  In: 1000 [I.V.:1000]  Out: 20 [Other:20]    Physical Exam   Constitutional: She is oriented to person, place, and time. She appears well-developed and well-nourished. No distress.   HENT:   Head: Normocephalic and atraumatic.   Eyes: Pupils are equal, round, and reactive to light. No scleral icterus.   Cardiovascular: Normal rate and regular rhythm.    Pulmonary/Chest: Effort normal. No respiratory distress. She has no wheezes.   Abdominal: Soft. Bowel sounds are normal. There is no hepatosplenomegaly.   Neurological: She is alert and oriented to person, place, and time.   Skin: No cyanosis. Nails show no clubbing.   Psychiatric: She has a normal mood and affect. Her behavior is normal.       Results from last 7 days  Lab Units 11/09/17  1305   WBC 10*3/mm3 5.01   HEMOGLOBIN g/dL 12.6   HEMATOCRIT % 38.5   PLATELETS 10*3/mm3 175       Results from last 7 days  Lab Units 11/12/17  0318 11/09/17  1305   SODIUM mmol/L 138 130*   POTASSIUM mmol/L 4.1 4.5   CHLORIDE mmol/L 102 91*   CO2 mmol/L 25.4 25.6   BUN mg/dL 26* 22   CREATININE mg/dL 0.99 1.36*   GLUCOSE mg/dL 97 105*   CALCIUM mg/dL 8.7 9.4        ASSESSMENT/ PLAN:  · Hiatal hernia,  S/p Laparoscopic paraesophageal hiatal hernia repair with mesh (11/12/2017)  · Hx of PE, no evidence of chronic thromboembolic disease  · CKD  · A fib  · DM        Darius Lopez MD, [facility]  Pulmonary, Critical Care and Sleep Medicine.  [facility]    11/12/2017 ,    Much of this encounter note is an electronic transcription/translation of spoken language to printed text. The electronic translation of spoken language may permit erroneous, or at times, nonsensical words or phrases to be inadvertently transcribed; Although I have reviewed the note for such errors, some may still exist.

## 2017-11-12 NOTE — ANESTHESIA PREPROCEDURE EVALUATION
Anesthesia Evaluation     Patient summary reviewed and Nursing notes reviewed          Airway   Mallampati: II  no difficulty expected  Dental - normal exam   (+) poor dentition    Pulmonary     breath sounds clear to auscultation  (+) asthma, shortness of breath,   Cardiovascular     ECG reviewed  Patient on routine beta blocker and Beta blocker given within 24 hours of surgery  Rhythm: regular  Rate: normal    (+) hypertension, valvular problems/murmurs, dysrhythmias, CHF,       Neuro/Psych  (+) numbness,    GI/Hepatic/Renal/Endo    (+)  hiatal hernia, GERD,     Musculoskeletal     Abdominal    Substance History      OB/GYN          Other      history of cancer                                  Anesthesia Plan    ASA 3     general     intravenous induction   Anesthetic plan and risks discussed with patient.    · Left ventricular systolic function is normal. Calculated EF = 61%. Estimated EF was in agreement with the calculated EF. Normal left ventricular cavity size and wall thickness noted. All left ventricular wall segments contract normally. Left ventricular diastolic was unable to be assessed.  · Moderate mitral valve regurgitation is present.  Moderate tricuspid valve regurgitation is present. Estimated right ventricular systolic pressure from tricuspid regurgitation is moderately

## 2017-11-12 NOTE — PROGRESS NOTES
" LOS: 1 day   Primary Care Physician: No primary care provider on file.     Subjective  Some nausea.  Having a lot of pain of her left shoulder.  This was present for the last week but worse now after surgery.  She denies any injury to the shoulder but known to have osteoarthritis.  When this was bothering her earlier in the week she started doing some exercises which did not seem to make a difference. Pain Meds have helped a little    Vital Signs  Body mass index is 32.93 kg/(m^2).  Temp:  [97.6 °F (36.4 °C)-98.4 °F (36.9 °C)] 97.8 °F (36.6 °C)  Heart Rate:  [53-88] 78  Resp:  [12-20] 16  BP: (125-178)/() 138/88      Objective:  Vital signs: (most recent): Blood pressure 138/88, pulse 78, temperature 97.8 °F (36.6 °C), temperature source Oral, resp. rate 16, height 60\" (152.4 cm), weight 168 lb 9.6 oz (76.5 kg), SpO2 95 %.    Lungs:  Normal respiratory rate and normal effort.  There are decreased breath sounds (Better expansion at left base).  No wheezes or rhonchi.    Heart: Irregular rhythm.  S1 normal and S2 normal.  Positive for murmur (grade 2/6 systolic murmur at the apex without radiation).    Chest: (Increased AP diameter with thoracic spine kyphosis)  Abdomen: Abdomen is soft.  Bowel sounds are normal.   There is no abdominal tenderness.     Extremities: (There is normal flexion abduction abduction of her shoulder.  Is mild tenderness to touch at the upper anterior chest as well as anterior shoulder.  No edema or erythema present)              Results Review:    I reviewed the patient's new clinical results.      Results from last 7 days  Lab Units 11/09/17  1305   WBC 10*3/mm3 5.01   HEMOGLOBIN g/dL 12.6   PLATELETS 10*3/mm3 175       Results from last 7 days  Lab Units 11/12/17  0318 11/09/17  1305   SODIUM mmol/L 138 130*   POTASSIUM mmol/L 4.1 4.5   CHLORIDE mmol/L 102 91*   CO2 mmol/L 25.4 25.6   BUN mg/dL 26* 22   CREATININE mg/dL 0.99 1.36*   CALCIUM mg/dL 8.7 9.4   GLUCOSE mg/dL 97 105*       "   Hemoglobin A1C:No results found for: HGBA1C    Glucose Range:  Glucose   Date/Time Value Ref Range Status   11/12/2017 0708 88 70 - 130 mg/dL Final       Medication Review: Yes    Physical Therapy:    Assessment/Plan     Active Hospital Problems (** Indicates Principal Problem)    Diagnosis Date Noted   • 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       Assessment & Plan  Shoulder pain:no evidence to suggest that there is any acute muscle or tendon injury  Likely referred pain from the surgery involving the diaphragm      Paraesophageal hernia: surgery today with the repair with mesh      Dyspnea: The secondary pulmonary hypertension may be related to her thoracic kyphosis.  ABGs were not done despite the order  May also have VINOD and consider an overnight oximetry study.    Hypomagnesemia; Replace IV    Chronic atrial fibrillation with Mitral regurgitation severe tricuspid regurgitation and secondary pulmonary hypertension    Previous pulmonary embolus on Xarelto on 2 mg a day due to excessive bruising    Hypertension and chronic kidney disease: Stable      Hyponatremia and hypochloremia: New since 9/17, resolved likely was related to the vomiting    History of gout: Normally on Zyloprim 300 mg a day    Recent bullous pemphigoid: Methotrexate prescribed for this        Disposition:    Le Reid MD  11/12/17  4:29 PM

## 2017-11-12 NOTE — ANESTHESIA POSTPROCEDURE EVALUATION
"Patient: Uzma Jerome    Procedure Summary     Date Anesthesia Start Anesthesia Stop Room / Location    11/12/17 0739 0941  [facility] /  [facility]       Procedure Diagnosis Surgeon Provider    PARAESOPHAGEAL HERNIA REPAIR LAPAROSCOPIC (N/A Abdomen) Paraesophageal hernia  (Paraesophageal hernia [K44.9]) MD Charles Cid MD          Anesthesia Type: general  Last vitals  BP   136/79 (11/12/17 1040)   Temp   36.5 °C (97.7 °F) (11/12/17 1045)   Pulse   73 (11/12/17 1040)   Resp   12 (11/12/17 1040)     SpO2   99 % (11/12/17 1040)     Post Anesthesia Care and Evaluation    Patient location during evaluation: bedside  Patient participation: complete - patient participated  Level of consciousness: awake  Pain score: 2  Pain management: adequate  Airway patency: patent  Anesthetic complications: No anesthetic complications    Cardiovascular status: acceptable  Respiratory status: acceptable  Hydration status: acceptable    Comments: /79  Pulse 73  Temp 36.5 °C (97.7 °F) (Oral)   Resp 12  Ht 60\" (152.4 cm)  Wt 168 lb 9.6 oz (76.5 kg)  LMP Comment: HYSTERECTOMY  SpO2 99%  BMI 32.93 kg/m2        "

## 2017-11-12 NOTE — OP NOTE
PREOPERATIVE DIAGNOSIS:  Paraesophageal hiatal hernia    POSTOPERATIVE DIAGNOSIS (FINDINGS):  Same    PROCEDURE:  Laparoscopic paraesophageal hiatal hernia repair with mesh    SURGEON:  Fer Medina MD    ASSISTANT:  Mila Rene    ANESTHESIA:  General    EBL:  Minimal    SPECIMEN(S):  none    DESCRIPTION:  In supine position under general anesthetic prepped and draped usual sterile manner.  Small incision made above the umbilicus and with upwards traction on the abdominal wall 5 mm Optiview trocar inserted.  Abdomen insufflated 15 mmHg.  Right subcostal 5, right subxiphoid 5, left subcostal 10, and left lateral subcostal 5 mm trochars were introduced.  Very large hernia was encountered with the entire stomach, portion of omentum and portion of colon in the hernia.  All were retracted inferiorly and I proceeded to divide the hernia sac at the level of the GE junction and from both crura.  The hernia sac was then removed from the mediastinum with the Harmonic scalpel.  Both crura were dissected and the retroesophageal window was created taking care to identify and avoid injury to the posterior vagus.  Penrose drain was placed around the esophagus and posterior vagus and mediastinal dissection was completed until several centimeters of intra-abdominal esophageal length were achieved without tension.  The crura were reapproximated with interrupted 0 silk until a 54 Singaporean bougie filled the new hiatus.  Tuttle bio a mesh was applied to the diaphragm and secured with 2-0 silk.  The posterior fundus was then pulled through the retroesophageal window and secured to the anterior fundus at the 9 o'clock position of the esophagus with 3 interrupted 0 silk sutures for a 2 cm fundoplication which easily allowed passage of the 54 Singaporean bougie.  The superior aspect of the fundoplication was secured to the esophagus with 2-0 silk.  10 mL of Tisseel was sprayed to the superior and inferior aspects of the bio a mesh.  Good  hemostasis was noted.  CO2 was released after placing a 19 Yemeni Hugo drain.  Skin edges were closed with 5-0 Vicryl subcuticular.  Tolerated well, stable to recovery area.    Fer Medina M.D.

## 2017-11-12 NOTE — PLAN OF CARE
Problem: Patient Care Overview (Adult)  Goal: Plan of Care Review  Outcome: Ongoing (interventions implemented as appropriate)    11/12/17 0819   Coping/Psychosocial Response Interventions   Plan Of Care Reviewed With patient   Patient Care Overview   Progress no change   Outcome Evaluation   Outcome Summary/Follow up Plan No significant changes over night , VS stable, no complaints of dyspnea or SOB, oxygen sats stable, pt pre-oped for surgery this am with no issues, picked up by OR transporter around 0630, taken off floor       Goal: Adult Individualization and Mutuality  Outcome: Ongoing (interventions implemented as appropriate)  Goal: Discharge Needs Assessment  Outcome: Ongoing (interventions implemented as appropriate)    Problem: Respiratory Insufficiency (Adult)  Goal: Identify Related Risk Factors and Signs and Symptoms  Outcome: Outcome(s) achieved Date Met:  11/12/17  Goal: Acid/Base Balance  Outcome: Ongoing (interventions implemented as appropriate)  Goal: Effective Ventilation  Outcome: Ongoing (interventions implemented as appropriate)    Problem: Perioperative Period (Adult)  Goal: Signs and Symptoms of Listed Potential Problems Will be Absent or Manageable (Perioperative Period)  Outcome: Ongoing (interventions implemented as appropriate)  Goal: Signs and Symptoms of Listed Potential Problems Will be Absent or Manageable (Perioperative Period)  Outcome: Ongoing (interventions implemented as appropriate)

## 2017-11-12 NOTE — ANESTHESIA PROCEDURE NOTES
Airway  Urgency: elective    Airway not difficult    General Information and Staff    Patient location during procedure: OR  Anesthesiologist: MELISSA JAUREGUI  CRNA: YASH DE SOUZA    Indications and Patient Condition  Indications for airway management: airway protection    Preoxygenated: yes  MILS maintained throughout  Mask difficulty assessment: 2 - vent by mask + OA or adjuvant +/- NMBA    Final Airway Details  Final airway type: endotracheal airway      Successful airway: ETT  Cuffed: yes   Successful intubation technique: direct laryngoscopy  Facilitating devices/methods: intubating stylet  Endotracheal tube insertion site: oral  Blade: Brian  Blade size: #3  ETT size: 7.0 mm  Cormack-Lehane Classification: grade I - full view of glottis  Placement verified by: chest auscultation and capnometry   Measured from: teeth  ETT to teeth (cm): 21  Number of attempts at approach: 1    Additional Comments  Atraumatic. Dentition as preop.

## 2017-11-13 LAB
ANION GAP SERPL CALCULATED.3IONS-SCNC: 14.5 MMOL/L
BASOPHILS # BLD AUTO: 0.01 10*3/MM3 (ref 0–0.2)
BASOPHILS NFR BLD AUTO: 0.1 % (ref 0–1.5)
BUN BLD-MCNC: 19 MG/DL (ref 8–23)
BUN/CREAT SERPL: 20.4 (ref 7–25)
CALCIUM SPEC-SCNC: 8.8 MG/DL (ref 8.6–10.5)
CHLORIDE SERPL-SCNC: 99 MMOL/L (ref 98–107)
CO2 SERPL-SCNC: 21.5 MMOL/L (ref 22–29)
CREAT BLD-MCNC: 0.93 MG/DL (ref 0.57–1)
DEPRECATED RDW RBC AUTO: 61.2 FL (ref 37–54)
EOSINOPHIL # BLD AUTO: 0 10*3/MM3 (ref 0–0.7)
EOSINOPHIL NFR BLD AUTO: 0 % (ref 0.3–6.2)
ERYTHROCYTE [DISTWIDTH] IN BLOOD BY AUTOMATED COUNT: 14.8 % (ref 11.7–13)
GFR SERPL CREATININE-BSD FRML MDRD: 57 ML/MIN/1.73
GLUCOSE BLD-MCNC: 139 MG/DL (ref 65–99)
HCT VFR BLD AUTO: 34 % (ref 35.6–45.5)
HGB BLD-MCNC: 11.1 G/DL (ref 11.9–15.5)
IMM GRANULOCYTES # BLD: 0.03 10*3/MM3 (ref 0–0.03)
IMM GRANULOCYTES NFR BLD: 0.3 % (ref 0–0.5)
LYMPHOCYTES # BLD AUTO: 1.59 10*3/MM3 (ref 0.9–4.8)
LYMPHOCYTES NFR BLD AUTO: 15.8 % (ref 19.6–45.3)
MAGNESIUM SERPL-MCNC: 1.8 MG/DL (ref 1.6–2.4)
MCH RBC QN AUTO: 37.2 PG (ref 26.9–32)
MCHC RBC AUTO-ENTMCNC: 32.6 G/DL (ref 32.4–36.3)
MCV RBC AUTO: 114.1 FL (ref 80.5–98.2)
MONOCYTES # BLD AUTO: 0.79 10*3/MM3 (ref 0.2–1.2)
MONOCYTES NFR BLD AUTO: 7.8 % (ref 5–12)
NEUTROPHILS # BLD AUTO: 7.65 10*3/MM3 (ref 1.9–8.1)
NEUTROPHILS NFR BLD AUTO: 76 % (ref 42.7–76)
PLATELET # BLD AUTO: 169 10*3/MM3 (ref 140–500)
PMV BLD AUTO: 11 FL (ref 6–12)
POTASSIUM BLD-SCNC: 4.7 MMOL/L (ref 3.5–5.2)
RBC # BLD AUTO: 2.98 10*6/MM3 (ref 3.9–5.2)
SODIUM BLD-SCNC: 135 MMOL/L (ref 136–145)
WBC NRBC COR # BLD: 10.07 10*3/MM3 (ref 4.5–10.7)

## 2017-11-13 PROCEDURE — 94799 UNLISTED PULMONARY SVC/PX: CPT

## 2017-11-13 PROCEDURE — 85025 COMPLETE CBC W/AUTO DIFF WBC: CPT | Performed by: SURGERY

## 2017-11-13 PROCEDURE — 83735 ASSAY OF MAGNESIUM: CPT | Performed by: HOSPITALIST

## 2017-11-13 PROCEDURE — 99232 SBSQ HOSP IP/OBS MODERATE 35: CPT | Performed by: INTERNAL MEDICINE

## 2017-11-13 PROCEDURE — 80048 BASIC METABOLIC PNL TOTAL CA: CPT | Performed by: SURGERY

## 2017-11-13 PROCEDURE — 25010000002 ENOXAPARIN PER 10 MG: Performed by: SURGERY

## 2017-11-13 PROCEDURE — 25010000002 HYDROMORPHONE PER 4 MG: Performed by: SURGERY

## 2017-11-13 RX ORDER — PANTOPRAZOLE SODIUM 40 MG/1
40 TABLET, DELAYED RELEASE ORAL
Status: DISCONTINUED | OUTPATIENT
Start: 2017-11-14 | End: 2017-11-14 | Stop reason: HOSPADM

## 2017-11-13 RX ORDER — MONTELUKAST SODIUM 10 MG/1
10 TABLET ORAL NIGHTLY
Status: DISCONTINUED | OUTPATIENT
Start: 2017-11-13 | End: 2017-11-14 | Stop reason: HOSPADM

## 2017-11-13 RX ORDER — ALLOPURINOL 300 MG/1
300 TABLET ORAL DAILY
Status: DISCONTINUED | OUTPATIENT
Start: 2017-11-13 | End: 2017-11-14 | Stop reason: HOSPADM

## 2017-11-13 RX ORDER — BUDESONIDE 0.5 MG/2ML
0.5 INHALANT ORAL
Status: DISCONTINUED | OUTPATIENT
Start: 2017-11-13 | End: 2017-11-14 | Stop reason: HOSPADM

## 2017-11-13 RX ORDER — ROPINIROLE 0.5 MG/1
0.5 TABLET, FILM COATED ORAL NIGHTLY
Status: DISCONTINUED | OUTPATIENT
Start: 2017-11-13 | End: 2017-11-14 | Stop reason: HOSPADM

## 2017-11-13 RX ADMIN — HYDROMORPHONE HYDROCHLORIDE 0.5 MG: 1 INJECTION, SOLUTION INTRAMUSCULAR; INTRAVENOUS; SUBCUTANEOUS at 19:31

## 2017-11-13 RX ADMIN — HYDROMORPHONE HYDROCHLORIDE 0.5 MG: 1 INJECTION, SOLUTION INTRAMUSCULAR; INTRAVENOUS; SUBCUTANEOUS at 01:30

## 2017-11-13 RX ADMIN — METOPROLOL TARTRATE 25 MG: 25 TABLET ORAL at 20:37

## 2017-11-13 RX ADMIN — DICLOFENAC 4 G: 10 GEL TOPICAL at 20:36

## 2017-11-13 RX ADMIN — HYDROMORPHONE HYDROCHLORIDE 0.5 MG: 1 INJECTION, SOLUTION INTRAMUSCULAR; INTRAVENOUS; SUBCUTANEOUS at 06:16

## 2017-11-13 RX ADMIN — ALLOPURINOL 300 MG: 300 TABLET ORAL at 17:00

## 2017-11-13 RX ADMIN — MONTELUKAST 10 MG: 10 TABLET, FILM COATED ORAL at 20:37

## 2017-11-13 RX ADMIN — BUDESONIDE 0.5 MG: 0.5 INHALANT RESPIRATORY (INHALATION) at 20:21

## 2017-11-13 RX ADMIN — ENOXAPARIN SODIUM 30 MG: 30 INJECTION SUBCUTANEOUS at 09:13

## 2017-11-13 RX ADMIN — DICLOFENAC 4 G: 10 GEL TOPICAL at 17:00

## 2017-11-13 RX ADMIN — ROPINIROLE HYDROCHLORIDE 0.5 MG: 0.5 TABLET, FILM COATED ORAL at 20:37

## 2017-11-13 RX ADMIN — METOPROLOL TARTRATE 25 MG: 25 TABLET ORAL at 09:13

## 2017-11-13 RX ADMIN — POTASSIUM CHLORIDE, DEXTROSE MONOHYDRATE AND SODIUM CHLORIDE 75 ML/HR: 150; 5; 450 INJECTION, SOLUTION INTRAVENOUS at 00:36

## 2017-11-13 NOTE — PROGRESS NOTES
"  Internal Medicine Note    Uzma Parkerocadio  87 y.o.      Chief Complaint:  No chief complaint on file.      Subjective       Patient Complaints: Postop day 2 from laparoscopic hiatal hernia repair.  She's doing exceedingly well.  She is already tolerating a clear liquid diet.  He is ambulating regularly.  Son is at the bedside and plans to go home with her until she has recovered adequately from surgery.    Objective     Vital Signs  Temp:  [97.8 °F (36.6 °C)-98.4 °F (36.9 °C)] 98.4 °F (36.9 °C)  Heart Rate:  [63-82] 70  Resp:  [16] 16  BP: (103-171)/(68-90) 128/71    Flowsheet Rows         First Filed Value    Admission Height  60\" (152.4 cm) Documented at 11/09/2017 1233    Admission Weight  168 lb 12.8 oz (76.6 kg) Documented at 11/09/2017 1445        169 lb (76.7 kg)    Intake/Output Summary (Last 24 hours) at 11/13/17 1257  Last data filed at 11/13/17 0824   Gross per 24 hour   Intake             2338 ml   Output               65 ml   Net             2273 ml       Physical Exam:   General Appearance:    Alert, cooperative, in no acute distress       Eyes:            Conjunctivae and sclerae normal, no   icterus, PERRLA   Neck:   No adenopathy, supple, trachea midline, no thyromegaly, no   carotid bruit, no JVD       Lungs:     Clear to auscultation,respirations regular, even and                  unlabored    Heart:    Regular rhythm and normal rate, normal S1 and S2, no            murmur, no gallop, no rub, no click       Abdomen:     bowel sounds present, no masses, soft non-tender, non-distended, no guarding,        Extremities:   Moves all extremities well, no edema, no cyanosis   Pulses:   Pulses palpable and equal bilaterally       Lymph nodes:   No palpable adenopathy        Results Review:     New clinical results reviewed        Assessment/Plan     Active Problems:    Atrial fibrillation    Benign essential hypertension    Pulmonary hypertension    Mitral insufficiency    Tricuspid regurgitation    " Dyspnea    Paraesophageal hernia    Hyponatremia  Pulmonary artery hypertension related to distant PE  Abnormal VQ scan consistent with the defect from her hiatal hernia  Chronic atrial fibrillation  Hypertension  History of bullous pemphigoid which is been well controlled with chronic low-dose methotrexate  MGUS  History of gout  Restless leg syndrome    She presented with a huge hilar hernia which included colonic portions within the chest and symptoms of intermittent obstruction.  She's had a laparoscopic repair of the hilar hernia doing exceedingly well at this point.  Tolerating diet and that is progressing.  Ambulating well.  Discharge planning will be per Dr. Medina.  We'll hold off on her xarelto until he is far enough from surgery to Dr. Medina is okay with resuming it.  We'll begin to start her back on her regular home medications.  She's having pain and left shoulder which preceded her surgery and is likely mechanical tendon related.  I plan to hold off on any steroids until she is further along in the healing process postoperatively.    Paramjit Pennington MD  11/13/17  12:57 PM    Dragon disclaimer:   Much of this encounter note is an electronic transcription/translation of spoken language to printed text. The electronic translation of spoken language may permit erroneous, or at times, nonsensical words or phrases to be inadvertently transcribed; Although I have reviewed the note for such errors, some may still exist.

## 2017-11-13 NOTE — PLAN OF CARE
Problem: Patient Care Overview (Adult)  Goal: Plan of Care Review  Outcome: Ongoing (interventions implemented as appropriate)    11/13/17 0825   Coping/Psychosocial Response Interventions   Plan Of Care Reviewed With patient   Patient Care Overview   Progress improving   Outcome Evaluation   Outcome Summary/Follow up Plan VSS, still in A-fib w/o s/s; pain controlled w/ IV dilaudid; refused to ambulate, education given; fall precaution provided; JANA drainage serosanguineous and moderate, dressing around the site changed; continue to monitor.       Goal: Adult Individualization and Mutuality  Outcome: Ongoing (interventions implemented as appropriate)  Goal: Discharge Needs Assessment  Outcome: Ongoing (interventions implemented as appropriate)    Problem: Respiratory Insufficiency (Adult)  Goal: Acid/Base Balance  Outcome: Ongoing (interventions implemented as appropriate)  Goal: Effective Ventilation  Outcome: Ongoing (interventions implemented as appropriate)    Problem: Perioperative Period (Adult)  Goal: Signs and Symptoms of Listed Potential Problems Will be Absent or Manageable (Perioperative Period)  Outcome: Ongoing (interventions implemented as appropriate)  Goal: Signs and Symptoms of Listed Potential Problems Will be Absent or Manageable (Perioperative Period)  Outcome: Ongoing (interventions implemented as appropriate)    Problem: Fall Risk (Adult)  Goal: Identify Related Risk Factors and Signs and Symptoms  Outcome: Ongoing (interventions implemented as appropriate)  Goal: Absence of Falls  Outcome: Ongoing (interventions implemented as appropriate)

## 2017-11-13 NOTE — DISCHARGE PLACEMENT REQUEST
"Uzma Jerome (87 y.o. Female)     Date of Birth Social Security Number Address Home Phone MRN    10/22/1930  [address] [address] [phone] [id number]    Buddhism Marital Status          Buddhism        Admission Date Admission Type Admitting Provider Attending Provider Department, Room/Bed    11/9/17 Elective Paramjit Pennington MD Dee, Michael W, MD [address], 447/1    Discharge Date Discharge Disposition Discharge Destination                      Attending Provider: Paramjit Pennington MD     Allergies:  Aspirin, Influenza A (H1n1) Monovalent Vaccine, Tetanus Toxoid    Isolation:  None   Infection:  None   Code Status:  FULL    Ht:  60\" (152.4 cm)   Wt:  169 lb (76.7 kg)    Admission Cmt:  None   Principal Problem:  None                Active Insurance as of 11/9/2017     Primary Coverage     Payor Plan Insurance Group Employer/Plan Group    MEDICARE MEDICARE A & B      Payor Plan Address Payor Plan Phone Number Effective From Effective To    [address] [phone] 10/1/1995     [place] [address]       Subscriber Name Subscriber Birth Date Member ID       UZMA JEROME 10/22/1930 [id number]           Secondary Coverage     Payor Plan Insurance Group Employer/Plan Group     FOR LIFE  FOR LIFE MC [id number]      Payor Plan Address Payor Plan Phone Number Effective From Effective To    [address] [phone] 11/8/2016     [place] [address]       Subscriber Name Subscriber Birth Date Member ID       UZMA JEROME 10/22/1930 [id number]                 Emergency Contacts      (Rel.) Home Phone Work Phone Mobile Phone    Lety Silveira (Sister) [phone] -- --              "

## 2017-11-13 NOTE — PROGRESS NOTES
Continued Stay Note  [facility]     Patient Name: Uzma Jerome  MRN: [id number]  Today's Date: 11/13/2017    Admit Date: 11/9/2017          Discharge Plan       11/13/17 1558    Case Management/Social Work Plan    Plan Home with family and home health.     Additional Comments Met with patient at bedside.  Family members present, patient provided permission to discuss plans with family present.  Patient and family feel that home health services will be needed upon discharge for skilled nursing and therapy services. Patient will stay with granddaughter Shamika for a few days [phone].  Spoke with Stacey at [facility] [phone], she confirmed they could accept patient and she asked that I fax face sheet, H&P, discharge orders.  Today I faxed face sheet, H&P to [phone].  Will continue to follow for discharge needs.               Discharge Codes     None            Radha Mejia RN

## 2017-11-13 NOTE — PROGRESS NOTES
Looks great, tolerating full liquids, planning on going home with her granddaughter.  If nothing changes she should be fine to go home tomorrow

## 2017-11-13 NOTE — PLAN OF CARE
Problem: Patient Care Overview (Adult)  Goal: Plan of Care Review  Outcome: Ongoing (interventions implemented as appropriate)    11/13/17 1464   Coping/Psychosocial Response Interventions   Plan Of Care Reviewed With patient   Patient Care Overview   Progress improving   Outcome Evaluation   Outcome Summary/Follow up Plan Vitals stable. started on full liquid diet, tolerating well. JANA drain still in place. pt ambulated in halls. family at bedside - Will continue to monitor.        Goal: Adult Individualization and Mutuality  Outcome: Ongoing (interventions implemented as appropriate)  Goal: Discharge Needs Assessment  Outcome: Ongoing (interventions implemented as appropriate)    Problem: Respiratory Insufficiency (Adult)  Goal: Acid/Base Balance  Outcome: Ongoing (interventions implemented as appropriate)  Goal: Effective Ventilation  Outcome: Ongoing (interventions implemented as appropriate)    Problem: Perioperative Period (Adult)  Goal: Signs and Symptoms of Listed Potential Problems Will be Absent or Manageable (Perioperative Period)  Outcome: Ongoing (interventions implemented as appropriate)  Goal: Signs and Symptoms of Listed Potential Problems Will be Absent or Manageable (Perioperative Period)  Outcome: Ongoing (interventions implemented as appropriate)    Problem: Fall Risk (Adult)  Goal: Absence of Falls  Outcome: Ongoing (interventions implemented as appropriate)

## 2017-11-13 NOTE — PROGRESS NOTES
Dr. SHANNAN Doherty    [address]    11/13/2017    Patient ID:  Name:  Uzma Jerome  MRN:  [id number]  10/22/1930  87 y.o.  female            CC/Reason for visit: Follow-up for    HPI: She still has shortness of breath but only during exertion.  Otherwise she is actually doing well on room air.  Denies sputum production; has a mild cough.    Vitals:  Vitals:    11/13/17 0025 11/13/17 0610 11/13/17 0714 11/13/17 0913   BP: 125/77  128/71    BP Location: Right arm  Right arm    Patient Position: Lying  Lying    Pulse: 67   70   Resp: 16  16    Temp: 98.1 °F (36.7 °C)  98.4 °F (36.9 °C)    TempSrc: Oral  Oral    SpO2: 92%  93%    Weight:  169 lb (76.7 kg)     Height:               Body mass index is 33.01 kg/(m^2).    Intake/Output Summary (Last 24 hours) at 11/13/17 1321  Last data filed at 11/13/17 0824   Gross per 24 hour   Intake             2338 ml   Output               65 ml   Net             2273 ml       Exam:  GEN:  No distress, appears stated age  EYES:   EOM-i, anicteric sclera bilat  ENT:    External ears/nose normal, OP clear  NECK:  Supple, midline trachea  LUNGS: Somewhat diminished breath sounds over the left base.  Right lung is clear.  No crackles or wheezing, nonlabored breathing  CV:  Normal S1S2, without murmur, no edema      Scheduled meds:    allopurinol 300 mg Oral Daily   budesonide 0.5 mg Nebulization BID - RT   diclofenac 4 g Topical 4x Daily   enoxaparin 30 mg Subcutaneous Daily   metoprolol tartrate 25 mg Oral Q12H   montelukast 10 mg Oral Nightly   [START ON 11/14/2017] pantoprazole 40 mg Oral Q AM   rOPINIRole 0.5 mg Oral Nightly     IV meds:                           Data Review:   I reviewed the patient's medications and new clinical results.  Lab Results   Component Value Date    CALCIUM 8.8 11/13/2017    PHOS 3.9 11/12/2017    MG 1.8 11/13/2017    MG 1.6 11/12/2017    MG 1.5 (L) 11/12/2017       Results from last 7 days  Lab Units 11/13/17  0311 11/12/17  0318  11/09/17  1305   SODIUM mmol/L 135* 138 130*   POTASSIUM mmol/L 4.7 4.1 4.5   CHLORIDE mmol/L 99 102 91*   CO2 mmol/L 21.5* 25.4 25.6   BUN mg/dL 19 26* 22   CREATININE mg/dL 0.93 0.99 1.36*   CALCIUM mg/dL 8.8 8.7 9.4   BILIRUBIN mg/dL  --   --  0.6   ALK PHOS U/L  --   --  48   ALT (SGPT) U/L  --   --  15   AST (SGOT) U/L  --   --  26   GLUCOSE mg/dL 139* 97 105*   WBC 10*3/mm3 10.07  --  5.01   HEMOGLOBIN g/dL 11.1*  --  12.6   PLATELETS 10*3/mm3 169  --  175   PROBNP pg/mL  --   --  2547.0*                 ASSESSMENT:   Dyspnea on exertion    Atrial fibrillation    Benign essential hypertension    Pulmonary hypertension    Mitral insufficiency    Tricuspid regurgitation    Dyspnea    Paraesophageal hernia    Hyponatremia      PLAN:  Patient is status post surgery with laparoscopic paraesophageal hiatal hernia repair with mesh.  Patient seems to be doing well.  She is on room air right now, not requiring oxygen.  I encouraged her to ambulate and take deep breaths.  We will check for nocturnal hypoxemia.        Kd Doherty MD  11/13/2017

## 2017-11-14 VITALS
OXYGEN SATURATION: 98 % | RESPIRATION RATE: 18 BRPM | TEMPERATURE: 98.9 F | SYSTOLIC BLOOD PRESSURE: 142 MMHG | HEIGHT: 60 IN | DIASTOLIC BLOOD PRESSURE: 80 MMHG | BODY MASS INDEX: 33.18 KG/M2 | HEART RATE: 80 BPM | WEIGHT: 169 LBS

## 2017-11-14 PROCEDURE — 25010000002 ENOXAPARIN PER 10 MG: Performed by: SURGERY

## 2017-11-14 PROCEDURE — 99232 SBSQ HOSP IP/OBS MODERATE 35: CPT | Performed by: INTERNAL MEDICINE

## 2017-11-14 PROCEDURE — 94799 UNLISTED PULMONARY SVC/PX: CPT

## 2017-11-14 RX ORDER — ROPINIROLE 0.5 MG/1
TABLET, FILM COATED ORAL
Qty: 180 TABLET | Refills: 0 | Status: SHIPPED | OUTPATIENT
Start: 2017-11-14 | End: 2018-02-12 | Stop reason: SDUPTHER

## 2017-11-14 RX ORDER — TRAMADOL HYDROCHLORIDE 50 MG/1
50 TABLET ORAL EVERY 6 HOURS PRN
Qty: 25 TABLET | Refills: 0 | Status: SHIPPED | OUTPATIENT
Start: 2017-11-14 | End: 2017-11-21

## 2017-11-14 RX ORDER — SPIRONOLACTONE 25 MG/1
TABLET ORAL
Qty: 90 TABLET | Refills: 0 | Status: SHIPPED | OUTPATIENT
Start: 2017-11-14 | End: 2018-02-12 | Stop reason: SDUPTHER

## 2017-11-14 RX ADMIN — DICLOFENAC 4 G: 10 GEL TOPICAL at 09:24

## 2017-11-14 RX ADMIN — BUDESONIDE 0.5 MG: 0.5 INHALANT RESPIRATORY (INHALATION) at 07:23

## 2017-11-14 RX ADMIN — ENOXAPARIN SODIUM 30 MG: 30 INJECTION SUBCUTANEOUS at 09:24

## 2017-11-14 RX ADMIN — METOPROLOL TARTRATE 25 MG: 25 TABLET ORAL at 09:24

## 2017-11-14 RX ADMIN — PANTOPRAZOLE SODIUM 40 MG: 40 TABLET, DELAYED RELEASE ORAL at 06:14

## 2017-11-14 RX ADMIN — ALLOPURINOL 300 MG: 300 TABLET ORAL at 09:24

## 2017-11-14 NOTE — PROGRESS NOTES
Case Management Discharge Note    Final Note: Home to [facility] with [facility].  Contacted home health agency and they have her on the schedule to be seen tomorrow.  Provided patient home health agency contact information.  Review of AVS and discharge summary, no needs noted.     Discharge Placement     Facility/Agency Request Status Selected? Address Phone Number Fax Number    [facility] Accepted    Yes [address] [facility] [address], [place] [address] [phone]         Other:  (car)    Discharge Codes: 01  Discharge to home

## 2017-11-14 NOTE — PLAN OF CARE
Problem: Patient Care Overview (Adult)  Goal: Plan of Care Review  Outcome: Ongoing (interventions implemented as appropriate)    11/14/17 0446   Coping/Psychosocial Response Interventions   Plan Of Care Reviewed With patient   Patient Care Overview   Progress improving   Outcome Evaluation   Outcome Summary/Follow up Plan pain controlled with IV and topical meds, no complaints of nausea, ambulated in room, possible discharge in AM, VSS, will continue to monitor         Problem: Respiratory Insufficiency (Adult)  Goal: Acid/Base Balance  Outcome: Ongoing (interventions implemented as appropriate)  Goal: Effective Ventilation  Outcome: Ongoing (interventions implemented as appropriate)    Problem: Perioperative Period (Adult)  Goal: Signs and Symptoms of Listed Potential Problems Will be Absent or Manageable (Perioperative Period)  Outcome: Ongoing (interventions implemented as appropriate)  Goal: Signs and Symptoms of Listed Potential Problems Will be Absent or Manageable (Perioperative Period)  Outcome: Ongoing (interventions implemented as appropriate)    Problem: Fall Risk (Adult)  Goal: Identify Related Risk Factors and Signs and Symptoms  Outcome: Outcome(s) achieved Date Met:  11/14/17  Goal: Absence of Falls  Outcome: Ongoing (interventions implemented as appropriate)

## 2017-11-14 NOTE — PLAN OF CARE
Problem: Patient Care Overview (Adult)  Goal: Plan of Care Review  Outcome: Outcome(s) achieved Date Met:  11/14/17 11/14/17 1047   Coping/Psychosocial Response Interventions   Plan Of Care Reviewed With patient   Patient Care Overview   Progress improving   Outcome Evaluation   Outcome Summary/Follow up Plan VSS. No pain. Being discharged.        Goal: Adult Individualization and Mutuality  Outcome: Outcome(s) achieved Date Met:  11/14/17  Goal: Discharge Needs Assessment  Outcome: Outcome(s) achieved Date Met:  11/14/17    Problem: Respiratory Insufficiency (Adult)  Goal: Acid/Base Balance  Outcome: Outcome(s) achieved Date Met:  11/14/17  Goal: Effective Ventilation  Outcome: Outcome(s) achieved Date Met:  11/14/17    Problem: Perioperative Period (Adult)  Goal: Signs and Symptoms of Listed Potential Problems Will be Absent or Manageable (Perioperative Period)  Outcome: Outcome(s) achieved Date Met:  11/14/17  Goal: Signs and Symptoms of Listed Potential Problems Will be Absent or Manageable (Perioperative Period)  Outcome: Outcome(s) achieved Date Met:  11/14/17    Problem: Fall Risk (Adult)  Goal: Absence of Falls  Outcome: Outcome(s) achieved Date Met:  11/14/17

## 2017-11-15 ENCOUNTER — EPISODE CHANGES (OUTPATIENT)
Dept: CASE MANAGEMENT | Facility: OTHER | Age: 82
End: 2017-11-15

## 2017-11-15 NOTE — PROGRESS NOTES
"  Internal Medicine Note    Uzma EBONY Justus  87 y.o.      Chief Complaint:  No chief complaint on file.      Subjective       Patient Complaints: Postop day 2 from laparoscopic hiatal hernia repair.  She's doing exceedingly well.  She is already tolerating a clear liquid diet.  He is ambulating regularly.  Son is at the bedside and plans to go home with her until she has recovered adequately from surgery.    Objective     Vital Signs       Flowsheet Rows         First Filed Value    Admission Height  60\" (152.4 cm) Documented at 11/09/2017 1233    Admission Weight  168 lb 12.8 oz (76.6 kg) Documented at 11/09/2017 1445        169 lb (76.7 kg)  No intake or output data in the 24 hours ending 11/15/17 1255    Physical Exam:   General Appearance:    Alert, cooperative, in no acute distress       Eyes:            Conjunctivae and sclerae normal, no   icterus, PERRLA   Neck:   No adenopathy, supple, trachea midline, no thyromegaly, no   carotid bruit, no JVD       Lungs:     Clear to auscultation,respirations regular, even and                  unlabored    Heart:    Regular rhythm and normal rate, normal S1 and S2, no            murmur, no gallop, no rub, no click       Abdomen:     bowel sounds present, no masses, soft non-tender, non-distended, no guarding,        Extremities:   Moves all extremities well, no edema, no cyanosis   Pulses:   Pulses palpable and equal bilaterally       Lymph nodes:   No palpable adenopathy        Results Review:     New clinical results reviewed        Assessment/Plan     Principal Problem:    Paraesophageal hernia  Active Problems:    Atrial fibrillation    Benign essential hypertension    Pulmonary hypertension    Mitral insufficiency    Tricuspid regurgitation    Dyspnea    Hyponatremia  Pulmonary artery hypertension related to distant PE  Abnormal VQ scan consistent with the defect from her hiatal hernia  Chronic atrial fibrillation  Hypertension  History of bullous pemphigoid which " is been well controlled with chronic low-dose methotrexate  MGUS  History of gout  Restless leg syndrome    She presented with a huge hilar hernia which included colonic portions within the chest and symptoms of intermittent obstruction.  She's had a laparoscopic repair of the hilar hernia doing exceedingly well at this point.  Tolerating diet and that is progressing.  Ambulating well.  We'll begin to start her back on her regular home medications.  We'll plan to resume her Xarelto about one week from surgery.  She is ready for discharge.    Paramjit Pennington MD  11/15/17  12:55 PM    Dragon disclaimer:   Much of this encounter note is an electronic transcription/translation of spoken language to printed text. The electronic translation of spoken language may permit erroneous, or at times, nonsensical words or phrases to be inadvertently transcribed; Although I have reviewed the note for such errors, some may still exist.

## 2017-11-15 NOTE — DISCHARGE SUMMARY
DATE OF ADMIT:  11/9/2017    DATE OF DISCHARGE:  11/14/2017    DIAGNOSIS:  Symptomatic paraesophageal hiatal hernia    PROCEDURES:  Laparoscopic paraesophageal hiatal hernia repair with mesh 11/12/2017    SUMMARY OF HOSPITAL COURSE:   Was admitted to the hospital with markedly symptomatic large paraesophageal hiatal hernia.  Was on Xarelto and therefore surgery was delayed until 11/12/2017.  Postoperative course was remarkably uneventful.  Discharged home with plans to stay with granddaughter for a while.  Follow-up with me in 1 week.    Fer Medina M.D.

## 2017-11-16 NOTE — PROGRESS NOTES
Continued Stay Note  [facility]     Patient Name: Uzma Jerome  MRN: [id number]  Today's Date: 11/16/2017    Admit Date: 11/9/2017          Discharge Plan       11/16/17 0823    Case Management/Social Work Plan    Plan Home with family and home health    Additional Comments - Azam Pennington [phone].               Discharge Codes     None        Expected Discharge Date and Time     Expected Discharge Date Expected Discharge Time    Nov 14, 2017             Radha Mejia RN

## 2017-11-20 RX ORDER — SYRINGE WITH NEEDLE, 1 ML 25GX5/8"
SYRINGE, EMPTY DISPOSABLE MISCELLANEOUS
Qty: 100 EACH | Refills: 2 | Status: SHIPPED | OUTPATIENT
Start: 2017-11-20

## 2017-11-29 RX ORDER — MONTELUKAST SODIUM 10 MG/1
TABLET ORAL
Qty: 90 TABLET | Refills: 0 | Status: SHIPPED | OUTPATIENT
Start: 2017-11-29 | End: 2018-02-27 | Stop reason: SDUPTHER

## 2017-12-04 ENCOUNTER — OFFICE VISIT (OUTPATIENT)
Dept: INTERNAL MEDICINE | Facility: CLINIC | Age: 82
End: 2017-12-04

## 2017-12-04 ENCOUNTER — OFFICE VISIT (OUTPATIENT)
Dept: SURGERY | Facility: CLINIC | Age: 82
End: 2017-12-04

## 2017-12-04 VITALS
WEIGHT: 162.4 LBS | HEART RATE: 81 BPM | SYSTOLIC BLOOD PRESSURE: 122 MMHG | HEIGHT: 60 IN | BODY MASS INDEX: 31.88 KG/M2 | DIASTOLIC BLOOD PRESSURE: 70 MMHG | OXYGEN SATURATION: 96 % | RESPIRATION RATE: 16 BRPM | TEMPERATURE: 97.5 F

## 2017-12-04 DIAGNOSIS — Z09 FOLLOW UP: Primary | ICD-10-CM

## 2017-12-04 DIAGNOSIS — I10 BENIGN ESSENTIAL HYPERTENSION: Primary | ICD-10-CM

## 2017-12-04 DIAGNOSIS — R06.09 DYSPNEA ON EXERTION: ICD-10-CM

## 2017-12-04 DIAGNOSIS — I27.20 PULMONARY HYPERTENSION (HCC): ICD-10-CM

## 2017-12-04 DIAGNOSIS — K21.9 GASTROESOPHAGEAL REFLUX DISEASE, ESOPHAGITIS PRESENCE NOT SPECIFIED: ICD-10-CM

## 2017-12-04 DIAGNOSIS — Z79.899 MEDICATION MANAGEMENT: ICD-10-CM

## 2017-12-04 PROBLEM — K44.9 PARAESOPHAGEAL HERNIA: Status: RESOLVED | Noted: 2017-11-11 | Resolved: 2017-12-04

## 2017-12-04 PROCEDURE — 99024 POSTOP FOLLOW-UP VISIT: CPT | Performed by: SURGERY

## 2017-12-04 PROCEDURE — 99213 OFFICE O/P EST LOW 20 MIN: CPT | Performed by: INTERNAL MEDICINE

## 2017-12-04 NOTE — PROGRESS NOTES
"Subjective   Uzma Jerome is a 87 y.o. female.     Chief Complaint   Patient presents with   • Hiatal Hernia     follow up ER 4 wks ago         Hiatal Hernia   This is a chronic problem. The current episode started more than 1 year ago. The problem occurs constantly. The problem has been resolved. Pertinent negatives include no abdominal pain, chest pain, chills, fever, nausea or vomiting. The symptoms are aggravated by eating. The treatment provided significant relief.        The following portions of the patient's history were reviewed and updated as appropriate: allergies, current medications, past social history and problem list.    Outpatient Prescriptions Marked as Taking for the 12/4/17 encounter (Office Visit) with Paramjit Pennington MD   Medication Sig Dispense Refill   • allopurinol (ZYLOPRIM) 300 MG tablet TAKE 1 TABLET DAILY 90 tablet 1   • B-D 3CC LUER-NICKY SYR 26GX5/8\" 26G X 5/8\" 3 ML misc USE 1 MONTHLY 100 each 2   • betamethasone, augmented, (DIPROLENE) 0.05 % cream APPLY EXTERNALLY DAILY 50 g 0   • budesonide (PULMICORT FLEXHALER) 180 MCG/ACT inhaler 2 (Two) Times a Day.     • Cholecalciferol (VITAMIN D3) 39941 units capsule Take 1 capsule by mouth once weekly 12 capsule 0   • cyanocobalamin 1000 MCG/ML injection INJECT 1 ML MONTHLY 3 mL 2   • diazePAM (VALIUM) 5 MG tablet Take 1 tablet by mouth 2 (Two) Times a Day As Needed for Muscle Spasms. 60 tablet 2   • diclofenac (VOLTAREN) 1 % gel gel Apply 4 g topically 2 (Two) Times a Day. 100 g 1   • FLOVENT  MCG/ACT inhaler USE 2 INHALATIONS TWICE A DAY 36 g 1   • folic acid (FOLVITE) 1 MG tablet TAKE 5 TABLETS DAILY 450 tablet 1   • GNP VITAMIN B-6 100 MG tablet TAKE TWO TABLETS BY MOUTH DAILY  100 tablet 0   • methotrexate 2.5 MG tablet TAKE 3 TABLETS ONCE A WEEK 36 tablet 0   • metoprolol tartrate (LOPRESSOR) 25 MG tablet TAKE ONE-HALF (1/2) TABLET TWICE A DAY (CHANGE FROM VERAPAMIL TO METOPROLOL TARTRATE) 90 tablet 1   • montelukast " (SINGULAIR) 10 MG tablet TAKE 1 TABLET DAILY 90 tablet 0   • NEXIUM 40 MG capsule TAKE 1 CAPSULE TWICE A  capsule 0   • pyridoxine (GNP VITAMIN B-6) 100 MG tablet Take 1 tablet by mouth 2 (Two) Times a Day. 270 tablet 3   • rOPINIRole (REQUIP) 0.5 MG tablet TAKE 2 TABLETS AT BEDTIME 180 tablet 0   • spironolactone (ALDACTONE) 25 MG tablet TAKE 1 TABLET DAILY 90 tablet 0   • XARELTO 10 MG tablet TAKE 1 TABLET DAILY 90 tablet 1   • XOPENEX HFA 45 MCG/ACT inhaler USE 2 INHALATIONS FOUR TIMES A DAY AS NEEDED 60 g 1   • [DISCONTINUED] doxycycline (VIBRAMYICN) 100 MG tablet TAKE 1 TABLET TWICE A DAY 20 tablet 1   • [DISCONTINUED] NEXIUM 40 MG capsule TAKE 1 CAPSULE TWICE A  capsule 0       Review of Systems   Constitutional: Negative for chills and fever.   Cardiovascular: Negative for chest pain.   Gastrointestinal: Negative for abdominal pain, nausea and vomiting.       Objective   Vitals:    12/04/17 1301   BP: 122/70   Pulse: 81   Resp: 16   Temp: 97.5 °F (36.4 °C)   SpO2: 96%      Last Weight    12/04/17  1301   Weight: 162 lb 6.4 oz (73.7 kg)    [unfilled]  Body mass index is 31.72 kg/(m^2).      Physical Exam   Constitutional: She appears well-developed and well-nourished. No distress.   HENT:   Head: Normocephalic and atraumatic.   Neck: Carotid bruit is not present. No thyromegaly present.   Cardiovascular: Normal rate, regular rhythm, normal heart sounds and intact distal pulses.  Exam reveals no gallop.    No murmur heard.  Pulmonary/Chest: Effort normal and breath sounds normal. No respiratory distress. She has no wheezes. She has no rales.   Abdominal: Soft. Bowel sounds are normal. She exhibits no mass. There is no tenderness. There is no guarding.         Problem List Items Addressed This Visit        Cardiovascular and Mediastinum    Benign essential hypertension - Primary       Digestive    GERD (gastroesophageal reflux disease)        Assessment/Plan   In for recheck from hiatal hernia  repair and paraesophageal hernia repair along with diaphragm repair.  She is 4 weeks out.  She's feeling much improved.  No GI symptoms at this point.  Pulmonary wise she is doing much better the repair of the diaphragm.  She's been released by surgery.  She's back on her Xarelto.  We'll cut down on Nexium from twice a day to 40 mg once daily.  If she does well with that we'll try to gradually switch her over to an H2 blocker.  She is due back in about 2 months for her routine visit.  She gets CBC and CMP every 3 months.  I'll see her back here in 6 months.  She is staying with granddaughter right now and doing fantastic.         Dragon disclaimer:   Much of this encounter note is an electronic transcription/translation of spoken language to printed text. The electronic translation of spoken language may permit erroneous, or at times, nonsensical words or phrases to be inadvertently transcribed; Although I have reviewed the note for such errors, some may still exist.

## 2017-12-05 NOTE — PROGRESS NOTES
Postoperative visit    Laparoscopic paraesophageal hiatal hernia repair with mesh 11/12/2017    Office visit: Incisions are healing well and she is doing wonderfully with no complaints of dysphagia or heartburn.  Will follow-up on an as-needed basis.

## 2017-12-07 ENCOUNTER — TELEPHONE (OUTPATIENT)
Dept: INTERNAL MEDICINE | Facility: CLINIC | Age: 82
End: 2017-12-07

## 2017-12-07 DIAGNOSIS — R19.7 DIARRHEA, UNSPECIFIED TYPE: Primary | ICD-10-CM

## 2017-12-07 NOTE — TELEPHONE ENCOUNTER
The patient called saying that she had been taking imodium for her diarrhea and had started to feel better. She stopped taking the medication yesterday and started having diarrhea again yesterday and this morning. She asked if there is anything else she can do as far as changing her diet or taking something else that might clear this up. Please advise. Thanks!    The Imodium is okay to take.  I would resume it was working.  Add a probiotic daily.  Also lets get a stool for C. Difficile toxin.

## 2017-12-21 RX ORDER — CHOLECALCIFEROL (VITAMIN D3) 1250 MCG
CAPSULE ORAL
Qty: 12 CAPSULE | Refills: 3 | Status: SHIPPED | OUTPATIENT
Start: 2017-12-21 | End: 2018-02-20 | Stop reason: SDUPTHER

## 2018-01-29 RX ORDER — FOLIC ACID 1 MG/1
TABLET ORAL
Qty: 450 TABLET | Refills: 1 | Status: SHIPPED | OUTPATIENT
Start: 2018-01-29 | End: 2018-07-28 | Stop reason: SDUPTHER

## 2018-02-02 ENCOUNTER — EPISODE CHANGES (OUTPATIENT)
Dept: CASE MANAGEMENT | Facility: OTHER | Age: 83
End: 2018-02-02

## 2018-02-05 RX ORDER — CYANOCOBALAMIN 1000 UG/ML
INJECTION, SOLUTION INTRAMUSCULAR; SUBCUTANEOUS
Qty: 3 ML | Refills: 2 | Status: SHIPPED | OUTPATIENT
Start: 2018-02-05 | End: 2018-10-31 | Stop reason: SDUPTHER

## 2018-02-12 RX ORDER — ROPINIROLE 0.5 MG/1
TABLET, FILM COATED ORAL
Qty: 180 TABLET | Refills: 0 | Status: SHIPPED | OUTPATIENT
Start: 2018-02-12 | End: 2018-05-13 | Stop reason: SDUPTHER

## 2018-02-12 RX ORDER — SPIRONOLACTONE 25 MG/1
TABLET ORAL
Qty: 90 TABLET | Refills: 0 | Status: SHIPPED | OUTPATIENT
Start: 2018-02-12 | End: 2018-05-13 | Stop reason: SDUPTHER

## 2018-02-20 RX ORDER — CHOLECALCIFEROL (VITAMIN D3) 1250 MCG
CAPSULE ORAL
Qty: 12 CAPSULE | Refills: 3 | Status: SHIPPED | OUTPATIENT
Start: 2018-02-20 | End: 2018-03-02 | Stop reason: SDUPTHER

## 2018-02-27 RX ORDER — MONTELUKAST SODIUM 10 MG/1
TABLET ORAL
Qty: 90 TABLET | Refills: 0 | Status: SHIPPED | OUTPATIENT
Start: 2018-02-27 | End: 2018-05-28 | Stop reason: SDUPTHER

## 2018-03-02 RX ORDER — CHOLECALCIFEROL (VITAMIN D3) 1250 MCG
CAPSULE ORAL
Qty: 12 CAPSULE | Refills: 3 | Status: SHIPPED | OUTPATIENT
Start: 2018-03-02 | End: 2018-03-06 | Stop reason: SDUPTHER

## 2018-03-06 RX ORDER — CHOLECALCIFEROL (VITAMIN D3) 1250 MCG
50000 CAPSULE ORAL
Qty: 12 CAPSULE | Refills: 3 | Status: SHIPPED | OUTPATIENT
Start: 2018-03-06

## 2018-04-02 RX ORDER — ALLOPURINOL 300 MG/1
TABLET ORAL
Qty: 90 TABLET | Refills: 1 | Status: SHIPPED | OUTPATIENT
Start: 2018-04-02 | End: 2018-09-27 | Stop reason: SDUPTHER

## 2018-04-03 ENCOUNTER — OFFICE VISIT (OUTPATIENT)
Dept: CARDIOLOGY | Facility: CLINIC | Age: 83
End: 2018-04-03

## 2018-04-03 VITALS
SYSTOLIC BLOOD PRESSURE: 132 MMHG | WEIGHT: 162 LBS | HEIGHT: 60 IN | BODY MASS INDEX: 31.8 KG/M2 | HEART RATE: 67 BPM | DIASTOLIC BLOOD PRESSURE: 66 MMHG

## 2018-04-03 DIAGNOSIS — I34.0 NON-RHEUMATIC MITRAL REGURGITATION: ICD-10-CM

## 2018-04-03 DIAGNOSIS — I48.20 CHRONIC ATRIAL FIBRILLATION (HCC): Primary | ICD-10-CM

## 2018-04-03 DIAGNOSIS — R06.02 SHORTNESS OF BREATH: ICD-10-CM

## 2018-04-03 DIAGNOSIS — I36.1 NON-RHEUMATIC TRICUSPID VALVE INSUFFICIENCY: ICD-10-CM

## 2018-04-03 PROCEDURE — 99214 OFFICE O/P EST MOD 30 MIN: CPT | Performed by: NURSE PRACTITIONER

## 2018-04-03 PROCEDURE — 93000 ELECTROCARDIOGRAM COMPLETE: CPT | Performed by: NURSE PRACTITIONER

## 2018-04-03 RX ORDER — LEVALBUTEROL TARTRATE 45 MCG
HFA AEROSOL WITH ADAPTER (GRAM) INHALATION
Qty: 60 G | Refills: 1 | Status: SHIPPED | OUTPATIENT
Start: 2018-04-03

## 2018-04-03 NOTE — PROGRESS NOTES
Date of Office Visit: 2018  Encounter Provider: KRISTA Perez  Place of Service: [facility] CARDIOLOGY  Patient Name: Uzma Jerome  :10/22/1930    Chief Complaint   Patient presents with   • Atrial Fibrillation   • Palpitations   • Shortness of Breath   • Dizziness   • Fatigue   :     HPI: Uzma Jerome is a 87 y.o. female is a patient of Dr. Ibrahim. I am seeing Her for the first time today and have reviewed her records.  Her past medical history is significant of atrial fibrillation, moderate mitral insufficiency, moderate to severe tricuspid insufficiency, pulmonary hypertension, hypertension, and status post hiatal hernia repair.    In 2015, she was initially evaluated for atrial fibrillation and had been having a lot of shortness of breath with activity.  She denied chest pain at that time and was started on diltiazem for rate control.    She had an echocardiogram that showed preserved left ventricular ejection fraction, severe right and left atrial  Enlargement, mild mitral valve prolapse with moderate mitral insufficiency, moderate to severe tricuspid insufficiency with moderate pulmonary hypertension.      He was last seen in the office on 5/10/17.  At that time, she was complaining of being very fatigued and tired with minimal activity and shortness of breath with activity.  It was believed that her dyspnea and fatigue was multi-factorial.  At that time she had an increased heart rate was in atrial fibrillation, could be related to her age, medications, mitral valve prolapse, or mitral and tricuspid insufficiency.  It was discussed that the patient would not want surgery.    In 2017 she had a laparoscopic paraesophageal hernia repair with Dr. Medina. She did relatively well with that.     She presents today for 6 month follow-up with her daughter who is visiting in town.  Denies chest pain, edema, near syncope, syncope, or blood in the  urine or stool.  Continues to have fatigue and shortness of breath mainly with exertion.  She does not believe that she's had any improvement of the shortness of breath since her hernia repair.  He states that for the past couple of months she has also become a little nauseated with this shortness of breath.  She has occasional palpitations.  What is new for her is left hand numbness that has occurred daily.  So occurs sporadically.  The last episode was last night and lasts for approximately 5 minutes.  Numbness is relieved by massaging the hand.  She states that she can move the hand without any problem but that is just numb.  She denies numbness of the left forearm or upper arm.  She denies any paralysis, slurred speech, aphasia, or new coordination disturbances.  She ambulates with a cane.  She has some edema in the ankles that she has noticed for the past week.  She states that she spends a lot of time sitting in the recliner and that she does not wear compression stockings.  Allergies   Allergen Reactions   • Aspirin    • Influenza A (H1n1) Monovalent Vaccine    • Tetanus Toxoid        Past Medical History:   Diagnosis Date   • Asthma    • Bullous pemphigoid    • Chronic atrial fibrillation    • Chronic diastolic CHF (congestive heart failure)    • CKD (chronic kidney disease) stage 3, GFR 30-59 ml/min    • Disorders of both mitral and tricuspid valves     mod MR, mod-severe TR   • Gout    • History of pulmonary embolus (PE)    • Hypertension    • Malaise and fatigue    • MGUS (monoclonal gammopathy of unknown significance)    • Mild aortic regurgitation    • Moderate mitral regurgitation    • Moderate tricuspid regurgitation    • Osteoporosis    • Peripheral neuropathy    • Permanent atrial fibrillation    • Pulmonary hypertension     secondary to long standing diastolic dysfunction   • Pulmonic regurgitation     Trace   • Venous stasis ulcers of both lower extremities        Past Surgical History:  "  Procedure Laterality Date   • ANKLE SURGERY     • APPENDECTOMY     • HYSTERECTOMY     • PARAESOPHAGEAL HERNIA REPAIR N/A 11/12/2017    Procedure: PARAESOPHAGEAL HERNIA REPAIR LAPAROSCOPIC;  Surgeon: Fer Medina MD;  Location: [facility] OR;  Service:    • REPLACEMENT TOTAL KNEE Bilateral    • TONSILLECTOMY           Family and social history reviewed.     Review of Systems   Constitution: Positive for malaise/fatigue.   Cardiovascular: Positive for dyspnea on exertion, leg swelling and palpitations.   Respiratory: Positive for shortness of breath.    Hematologic/Lymphatic: Negative for bleeding problem.   Musculoskeletal: Positive for joint swelling.   Neurological: Positive for numbness (left hand approx 1-2 months, intermittent, lasting 5 minutes, relieved with massage. ).     All other systems were reviewed and are negative          Objective:     Vitals:    04/03/18 1416   BP: 132/66   BP Location: Left arm   Patient Position: Sitting   Pulse: 67   Weight: 73.5 kg (162 lb)   Height: 152.4 cm (60\")     Body mass index is 31.64 kg/m².    PHYSICAL EXAM:  Physical Exam   Constitutional: She is oriented to person, place, and time. She appears well-developed and well-nourished. No distress.   HENT:   Head: Normocephalic.   Eyes: Conjunctivae are normal.   Neck: Normal range of motion. No JVD present.   Cardiovascular: Normal rate and intact distal pulses.  An irregular rhythm present.   Murmur heard.   Systolic murmur is present with a grade of 2/6  at the lower left sternal border  Pulses:       Carotid pulses are 2+ on the right side, and 2+ on the left side.       Radial pulses are 2+ on the right side, and 2+ on the left side.        Posterior tibial pulses are 2+ on the right side, and 2+ on the left side.   Pulmonary/Chest: Effort normal and breath sounds normal. No respiratory distress. She has no wheezes. She has no rhonchi. She has no rales. She exhibits no tenderness.   Abdominal: Soft. Bowel sounds " "are normal. She exhibits no distension.   Musculoskeletal: Normal range of motion. She exhibits edema (bilateral ankles 1-2+).   Neurological: She is alert and oriented to person, place, and time.   Skin: Skin is warm, dry and intact. No rash noted. She is not diaphoretic. No cyanosis.   Psychiatric: She has a normal mood and affect. Her behavior is normal. Judgment and thought content normal.         ECG 12 Lead  Date/Time: 4/3/2018 4:42 PM  Performed by: LISANDRO TAPIA  Authorized by: LISANDRO TAPIA   Comparison: compared with previous ECG from 5/10/2017  Similar to previous ECG  Rhythm: atrial fibrillation  Rate: normal  BPM: 67  Clinical impression: abnormal ECG            Current Outpatient Prescriptions   Medication Sig Dispense Refill   • allopurinol (ZYLOPRIM) 300 MG tablet TAKE 1 TABLET DAILY 90 tablet 1   • B-D 3CC LUER-NICKY SYR 26GX5/8\" 26G X 5/8\" 3 ML misc USE 1 MONTHLY 100 each 2   • betamethasone, augmented, (DIPROLENE) 0.05 % cream APPLY EXTERNALLY DAILY 50 g 0   • budesonide (PULMICORT FLEXHALER) 180 MCG/ACT inhaler 2 (Two) Times a Day.     • Cholecalciferol (VITAMIN D3) 15593 units capsule Take 1 capsule by mouth Every 7 (Seven) Days. Take 1 capsule by mouth once weekly 12 capsule 3   • cyanocobalamin 1000 MCG/ML injection INJECT 1 ML MONTHLY 3 mL 2   • diazePAM (VALIUM) 5 MG tablet Take 1 tablet by mouth 2 (Two) Times a Day As Needed for Muscle Spasms. 60 tablet 2   • diclofenac (VOLTAREN) 1 % gel gel Apply 4 g topically 2 (Two) Times a Day. 100 g 1   • FLOVENT  MCG/ACT inhaler USE 2 INHALATIONS TWICE A DAY 36 g 1   • folic acid (FOLVITE) 1 MG tablet TAKE 5 TABLETS DAILY 450 tablet 1   • GNP VITAMIN B-6 100 MG tablet TAKE TWO TABLETS BY MOUTH DAILY  100 tablet 0   • methotrexate 2.5 MG tablet TAKE 3 TABLETS ONCE A WEEK 36 tablet 0   • metoprolol tartrate (LOPRESSOR) 25 MG tablet TAKE ONE-HALF (1/2) TABLET TWICE A DAY (CHANGE FROM VERAPAMIL TO METOPROLOL TARTRATE) 90 tablet 1   • montelukast " (SINGULAIR) 10 MG tablet TAKE 1 TABLET DAILY 90 tablet 0   • NEXIUM 40 MG capsule TAKE 1 CAPSULE TWICE A  capsule 0   • pyridoxine (GNP VITAMIN B-6) 100 MG tablet Take 1 tablet by mouth 2 (Two) Times a Day. 270 tablet 3   • rOPINIRole (REQUIP) 0.5 MG tablet TAKE 2 TABLETS AT BEDTIME 180 tablet 0   • spironolactone (ALDACTONE) 25 MG tablet TAKE 1 TABLET DAILY 90 tablet 0   • XARELTO 10 MG tablet TAKE 1 TABLET DAILY 90 tablet 1   • XOPENEX HFA 45 MCG/ACT inhaler USE 2 INHALATIONS FOUR TIMES A DAY AS NEEDED 60 g 1     No current facility-administered medications for this visit.      Assessment:       Diagnosis Plan   1. Chronic atrial fibrillation  ECG 12 Lead   2. Non-rheumatic mitral regurgitation  Stress Test With Pet Myocardial Perfusion    ECG 12 Lead   3. Non-rheumatic tricuspid valve insufficiency  Stress Test With Pet Myocardial Perfusion    ECG 12 Lead   4. Shortness of breath  Stress Test With Pet Myocardial Perfusion    ECG 12 Lead        Orders Placed This Encounter   Procedures   • Stress Test With Pet Myocardial Perfusion     Standing Status:   Future     Standing Expiration Date:   4/3/2019     Order Specific Question:   What stress agent will be used?     Answer:   Regadenoson (Lexiscan)     Order Specific Question:   Difficulty walking criteria?     Answer:   Poor Exercise Tolerance     Order Specific Question:   Reason for exam?     Answer:   Arrhythmia     Comments:   shortness of breath     Order Specific Question:   Reason for exam?     Answer:   Other Reasons (uncertain in most circumstances)     Order Specific Question:   Other Reasons (uncertain in most circumstances)?     Answer:   Valvular Disease   • ECG 12 Lead     This order was created via procedure documentation         Plan:     1.  This is an 87-year-old female with chronic atrial fibrillation.  Atrial Fibrillation and Atrial Flutter  Assessment  • The patient has permanent atrial fibrillation  • This is valvular in etiology  •  The patient's CHADS2-VASc score is 5  • A [id number] score of 2 or more is considered a high risk for a thromboembolic event  • Rivaroxaban prescribed    Plan  • Continue in atrial fibrillation with rate control  • Continue rivaroxaban for antithrombotic therapy, bleeding issues discussed  • Continue beta blocker for rate control    Creatinine clearance is 49.44- she has been taking Xarelto 10 mg. She would benefit from Xarelto 20 mg.Will re-evaluate next visit.    2. Dyspnea on exertion- this is probably multifactorial including her age, multiple medications, and valvular disease.  It has been discussed in the past that she would not want any intervention for the valvular disease.  She is also status post hiatal hernia repair and has not had any relief of the shortness of breath.  I will have her schedule for a perfusion PET scan. She plans to have this completed in 2 months when she comes to [place] for Dr. Pennington appointment.     3.  Valvular heart disease with moderate mitral insufficiency.  As been discussed in the past that she is not an intervention candidate in addition she would not want surgery.  4.  Moderate pulmonary hypertension with moderate to severe tricuspid insufficiency.  5.  History of hypertension.  Blood pressure today is 132/66.    6.  Status post large hiatal hernia repair with Dr. Medina. She has not had relief of shortness of breath since this.     7. Left hand numbess- this is new for the past 1-2 months. Intermittent, last for 5 minutes and resolves with massage. She has no paralysis, slurred speech, or associated weakness. It occurs only in the left hand and does not include the forearm or upper arm. She is able to move the hand during the episode. I encouraged her to follow up with PCP about this.     8. Bilateral ankle edema- new for 1-2 weeks. She sits for long periods of time in the recliner. I recommended elevation and compression stockings. She is to monitor this and notify us  if this worsens.     Follow up in 6 months with Dr. Ibrahim    Patient was instructed to call the office if new symptoms develop or report to nearest ER if heart attack or stroke is suspected.        It has been a pleasure to participate in this patient's care.      Thank you,  KRISTA Perez      **Shelley Disclaimer:**  Much of this encounter note is an electronic transcription/translation of spoken language to printed text. The electronic translation of spoken language may permit erroneous, or at times, nonsensical words or phrases to be inadvertently transcribed. Although I have reviewed the note for such errors, some may still exist.

## 2018-04-18 LAB
ALBUMIN SERPL-MCNC: 4.1 G/DL (ref 3.5–4.7)
ALBUMIN/GLOB SERPL: 1.6 {RATIO} (ref 1.2–2.2)
ALP SERPL-CCNC: 42 IU/L (ref 39–117)
ALT SERPL-CCNC: 17 IU/L (ref 0–32)
AST SERPL-CCNC: 25 IU/L (ref 0–40)
BASOPHILS # BLD AUTO: 0 X10E3/UL (ref 0–0.2)
BASOPHILS NFR BLD AUTO: 0 %
BILIRUB SERPL-MCNC: 0.5 MG/DL (ref 0–1.2)
BUN SERPL-MCNC: 23 MG/DL (ref 8–27)
BUN/CREAT SERPL: 16 (ref 12–28)
CALCIUM SERPL-MCNC: 9.3 MG/DL (ref 8.7–10.3)
CHLORIDE SERPL-SCNC: 97 MMOL/L (ref 96–106)
CO2 SERPL-SCNC: 22 MMOL/L (ref 18–29)
CREAT SERPL-MCNC: 1.4 MG/DL (ref 0.57–1)
EOSINOPHIL # BLD AUTO: 0.1 X10E3/UL (ref 0–0.4)
EOSINOPHIL NFR BLD AUTO: 2 %
ERYTHROCYTE [DISTWIDTH] IN BLOOD BY AUTOMATED COUNT: 15.3 % (ref 12.3–15.4)
GFR SERPLBLD CREATININE-BSD FMLA CKD-EPI: 34 ML/MIN/1.73
GFR SERPLBLD CREATININE-BSD FMLA CKD-EPI: 39 ML/MIN/1.73
GLOBULIN SER CALC-MCNC: 2.6 G/DL (ref 1.5–4.5)
GLUCOSE SERPL-MCNC: 90 MG/DL (ref 65–99)
HCT VFR BLD AUTO: 32.7 % (ref 34–46.6)
HGB BLD-MCNC: 11.1 G/DL (ref 11.1–15.9)
IMM GRANULOCYTES # BLD: 0 X10E3/UL (ref 0–0.1)
IMM GRANULOCYTES NFR BLD: 0 %
LYMPHOCYTES # BLD AUTO: 1.9 X10E3/UL (ref 0.7–3.1)
LYMPHOCYTES NFR BLD AUTO: 36 %
MCH RBC QN AUTO: 38.9 PG (ref 26.6–33)
MCHC RBC AUTO-ENTMCNC: 33.9 G/DL (ref 31.5–35.7)
MCV RBC AUTO: 115 FL (ref 79–97)
MONOCYTES # BLD AUTO: 0.5 X10E3/UL (ref 0.1–0.9)
MONOCYTES NFR BLD AUTO: 9 %
NEUTROPHILS # BLD AUTO: 2.8 X10E3/UL (ref 1.4–7)
NEUTROPHILS NFR BLD AUTO: 53 %
PLATELET # BLD AUTO: 177 X10E3/UL (ref 150–379)
POTASSIUM SERPL-SCNC: 4.8 MMOL/L (ref 3.5–5.2)
PROT SERPL-MCNC: 6.7 G/DL (ref 6–8.5)
RBC # BLD AUTO: 2.85 X10E6/UL (ref 3.77–5.28)
SODIUM SERPL-SCNC: 135 MMOL/L (ref 134–144)
WBC # BLD AUTO: 5.3 X10E3/UL (ref 3.4–10.8)

## 2018-04-20 RX ORDER — PYRIDOXINE HCL (VITAMIN B6) 100 MG
200 TABLET ORAL DAILY
Qty: 100 TABLET | Refills: 0 | Status: SHIPPED | OUTPATIENT
Start: 2018-04-20

## 2018-04-24 RX ORDER — RIVAROXABAN 10 MG/1
TABLET, FILM COATED ORAL
Qty: 90 TABLET | Refills: 1 | Status: SHIPPED | OUTPATIENT
Start: 2018-04-24 | End: 2018-10-18 | Stop reason: SDUPTHER

## 2018-05-04 ENCOUNTER — TELEPHONE (OUTPATIENT)
Dept: INTERNAL MEDICINE | Facility: CLINIC | Age: 83
End: 2018-05-04

## 2018-05-04 RX ORDER — NITROFURANTOIN 25; 75 MG/1; MG/1
100 CAPSULE ORAL 2 TIMES DAILY
Qty: 10 CAPSULE | Refills: 0 | Status: SHIPPED | OUTPATIENT
Start: 2018-05-04 | End: 2018-05-04

## 2018-05-04 RX ORDER — NITROFURANTOIN 25; 75 MG/1; MG/1
100 CAPSULE ORAL 2 TIMES DAILY
Qty: 10 CAPSULE | Refills: 0 | Status: SHIPPED | OUTPATIENT
Start: 2018-05-04

## 2018-05-04 NOTE — TELEPHONE ENCOUNTER
The patient called stating she has a UTI and asking if you would send a prescription to her pharmacy. Patient did not want to go to urgent care to be seen for this. Please advise. Thanks!

## 2018-05-14 RX ORDER — ROPINIROLE 0.5 MG/1
TABLET, FILM COATED ORAL
Qty: 180 TABLET | Refills: 0 | Status: SHIPPED | OUTPATIENT
Start: 2018-05-14 | End: 2018-08-11 | Stop reason: SDUPTHER

## 2018-05-14 RX ORDER — SPIRONOLACTONE 25 MG/1
TABLET ORAL
Qty: 90 TABLET | Refills: 0 | Status: SHIPPED | OUTPATIENT
Start: 2018-05-14 | End: 2018-08-11 | Stop reason: SDUPTHER

## 2018-05-21 RX ORDER — TRIMETHOPRIM 100 MG/1
TABLET ORAL
Qty: 90 TABLET | Refills: 1 | Status: SHIPPED | OUTPATIENT
Start: 2018-05-21 | End: 2018-11-13 | Stop reason: SDUPTHER

## 2018-05-29 RX ORDER — MONTELUKAST SODIUM 10 MG/1
TABLET ORAL
Qty: 90 TABLET | Refills: 0 | Status: SHIPPED | OUTPATIENT
Start: 2018-05-29

## 2018-07-30 RX ORDER — FOLIC ACID 1 MG/1
TABLET ORAL
Qty: 450 TABLET | Refills: 1 | Status: SHIPPED | OUTPATIENT
Start: 2018-07-30

## 2018-08-13 RX ORDER — SPIRONOLACTONE 25 MG/1
TABLET ORAL
Qty: 90 TABLET | Refills: 0 | Status: SHIPPED | OUTPATIENT
Start: 2018-08-13 | End: 2018-11-10 | Stop reason: SDUPTHER

## 2018-08-13 RX ORDER — ROPINIROLE 0.5 MG/1
TABLET, FILM COATED ORAL
Qty: 180 TABLET | Refills: 0 | Status: SHIPPED | OUTPATIENT
Start: 2018-08-13 | End: 2018-11-10 | Stop reason: SDUPTHER

## 2018-08-27 RX ORDER — MONTELUKAST SODIUM 10 MG/1
TABLET ORAL
Qty: 90 TABLET | Refills: 0 | OUTPATIENT
Start: 2018-08-27

## 2018-08-27 RX ORDER — ESOMEPRAZOLE MAGNESIUM 40 MG/1
CAPSULE, DELAYED RELEASE ORAL
Qty: 180 CAPSULE | Refills: 0 | OUTPATIENT
Start: 2018-08-27

## 2018-09-27 RX ORDER — ALLOPURINOL 300 MG/1
TABLET ORAL
Qty: 90 TABLET | Refills: 1 | Status: SHIPPED | OUTPATIENT
Start: 2018-09-27 | End: 2019-03-26 | Stop reason: SDUPTHER

## 2018-10-08 ENCOUNTER — EPISODE CHANGES (OUTPATIENT)
Dept: CASE MANAGEMENT | Facility: OTHER | Age: 83
End: 2018-10-08

## 2018-10-18 RX ORDER — RIVAROXABAN 10 MG/1
TABLET, FILM COATED ORAL
Qty: 90 TABLET | Refills: 0 | Status: SHIPPED | OUTPATIENT
Start: 2018-10-18 | End: 2019-01-16 | Stop reason: SDUPTHER

## 2018-10-30 RX ORDER — ESOMEPRAZOLE MAGNESIUM 40 MG/1
40 CAPSULE, DELAYED RELEASE ORAL 2 TIMES DAILY
Qty: 180 CAPSULE | Refills: 0 | Status: SHIPPED | OUTPATIENT
Start: 2018-10-30

## 2018-10-31 RX ORDER — CYANOCOBALAMIN 1000 UG/ML
INJECTION, SOLUTION INTRAMUSCULAR; SUBCUTANEOUS
Qty: 3 ML | Refills: 2 | Status: SHIPPED | OUTPATIENT
Start: 2018-10-31

## 2018-11-12 RX ORDER — ROPINIROLE 0.5 MG/1
TABLET, FILM COATED ORAL
Qty: 180 TABLET | Refills: 0 | Status: SHIPPED | OUTPATIENT
Start: 2018-11-12

## 2018-11-12 RX ORDER — SPIRONOLACTONE 25 MG/1
TABLET ORAL
Qty: 90 TABLET | Refills: 0 | Status: SHIPPED | OUTPATIENT
Start: 2018-11-12

## 2018-11-13 RX ORDER — NITROFURANTOIN 25; 75 MG/1; MG/1
CAPSULE ORAL
Qty: 10 CAPSULE | Refills: 0 | Status: SHIPPED | OUTPATIENT
Start: 2018-11-13

## 2018-11-13 RX ORDER — BETAMETHASONE DIPROPIONATE 0.5 MG/G
CREAM TOPICAL
Qty: 50 G | Refills: 0 | Status: SHIPPED | OUTPATIENT
Start: 2018-11-13

## 2018-11-13 RX ORDER — TRIMETHOPRIM 100 MG/1
TABLET ORAL
Qty: 90 TABLET | Refills: 1 | Status: SHIPPED | OUTPATIENT
Start: 2018-11-13

## 2019-01-16 RX ORDER — RIVAROXABAN 10 MG/1
TABLET, FILM COATED ORAL
Qty: 90 TABLET | Refills: 0 | Status: SHIPPED | OUTPATIENT
Start: 2019-01-16 | End: 2019-04-16 | Stop reason: SDUPTHER

## 2019-03-26 RX ORDER — ALLOPURINOL 300 MG/1
TABLET ORAL
Qty: 90 TABLET | Refills: 1 | Status: SHIPPED | OUTPATIENT
Start: 2019-03-26

## 2019-04-16 RX ORDER — RIVAROXABAN 10 MG/1
TABLET, FILM COATED ORAL
Qty: 90 TABLET | Refills: 3 | Status: SHIPPED | OUTPATIENT
Start: 2019-04-16 | End: 2020-04-10

## 2019-04-16 NOTE — TELEPHONE ENCOUNTER
Livia,  We received a refill request for Ms. Jerome' s Xarelto 10 mg daily.  Per pt's LOV note you stated the following...  Creatinine clearance is 49.44- she has been taking Xarelto 10 mg. She would benefit from Xarelto 20 mg.Will re-evaluate next visit.    Pt cancelled her appt in 10/2018.  Is it okay to refill the Xarelto 10 mg? Or 20 mg?   Pt does not have an upcoming appointment scheduled.  Thanks--Gilda

## 2019-10-16 RX ORDER — ALLOPURINOL 300 MG/1
TABLET ORAL
Qty: 90 TABLET | Refills: 4 | OUTPATIENT
Start: 2019-10-16

## 2020-04-10 RX ORDER — RIVAROXABAN 10 MG/1
TABLET, FILM COATED ORAL
Qty: 90 TABLET | Refills: 1 | Status: SHIPPED | OUTPATIENT
Start: 2020-04-10

## 2020-04-16 NOTE — TELEPHONE ENCOUNTER
I checked with Brandie to see if patient is a [facility] pt but is is not.  I thought that might be the reason she had not been seen in the office. I also attempted to contact patient and could not leave a message on either the home or cell phone./ KATHY

## 2023-09-11 NOTE — TELEPHONE ENCOUNTER
Patient notified Dr. Ibrahim wants her to switch from Verapamil to Metoprolol Tartrate.  Rx sent to [facility] per patient's request./ KATHY   Living

## (undated) DEVICE — GOWN ,SIRUS,NONREINFORCED SMALL: Brand: MEDLINE

## (undated) DEVICE — CAP CONN RED

## (undated) DEVICE — ENDOPATH XCEL BLADELESS TROCARS WITH STABILITY SLEEVES: Brand: ENDOPATH XCEL

## (undated) DEVICE — LOU LAP CHOLE: Brand: MEDLINE INDUSTRIES, INC.

## (undated) DEVICE — GLV SURG BIOGEL LTX PF 6

## (undated) DEVICE — SKIN PREP TRAY W/CHG: Brand: MEDLINE INDUSTRIES, INC.

## (undated) DEVICE — SUT VIC 5/0 PS2 18IN J495H

## (undated) DEVICE — Device

## (undated) DEVICE — ADHS SKIN DERMABOND

## (undated) DEVICE — ENDOPATH XCEL UNIVERSAL TROCAR STABLILITY SLEEVES: Brand: ENDOPATH XCEL

## (undated) DEVICE — DRN PENRS 1/4X18IN LTX

## (undated) DEVICE — DISPOSABLE GRASPER CARTRIDGE: Brand: DIRECT DRIVE REPOSABLE GRASPERS

## (undated) DEVICE — VISUALIZATION SYSTEM: Brand: CLEARIFY

## (undated) DEVICE — SPNG GZ WOVN 4X4IN 12PLY 10/BX STRL

## (undated) DEVICE — ENCORE® LATEX ORTHO SIZE 7.5, STERILE LATEX POWDER-FREE SURGICAL GLOVE: Brand: ENCORE

## (undated) DEVICE — ENDOPATH PNEUMONEEDLE INSUFFLATION NEEDLES WITH LUER LOCK CONNECTORS 120MM: Brand: ENDOPATH

## (undated) DEVICE — COVER,MAYO STAND,STERILE: Brand: MEDLINE

## (undated) DEVICE — ENDOCUT SCISSOR TIP, DISPOSABLE: Brand: RENEW

## (undated) DEVICE — LAPAROSCOPIC SMOKE ELIMINATION DEVICE: Brand: PNEUVIEW XE

## (undated) DEVICE — LEGGINGS, PAIR, CLEAR, STERILE: Brand: MEDLINE

## (undated) DEVICE — TOWEL,OR,DSP,ST,BLUE,STD,4/PK,20PK/CS: Brand: MEDLINE

## (undated) DEVICE — JACKSON-PRATT 100CC BULB RESERVOIR: Brand: CARDINAL HEALTH

## (undated) DEVICE — MARKR SKIN W/RULR AND LBL

## (undated) DEVICE — HARMONIC ACE +7 LAPAROSCOPIC SHEARS ADVANCED HEMOSTASIS 5MM DIAMETER 36CM SHAFT LENGTH  FOR USE WITH GRAY HAND PIECE ONLY: Brand: HARMONIC ACE

## (undated) DEVICE — SUT SILK 0 SH 30IN K834H

## (undated) DEVICE — JELLY,LUBE,STERILE,FLIP TOP,TUBE,4-OZ: Brand: MEDLINE

## (undated) DEVICE — SUT VIC 0 TN 27IN DYED JTN0G

## (undated) DEVICE — BANDAGE,GAUZE,BULKEE II,4.5"X4.1YD,STRL: Brand: MEDLINE

## (undated) DEVICE — SUT SILK 2/0 SH 30IN K833H

## (undated) DEVICE — LAPAROSCOPIC GAS CONDITIONING DEVICE.: Brand: INSUFLOW

## (undated) DEVICE — 1842 FOAM BLOCK NEEDLE COUNTER: Brand: DEVON

## (undated) DEVICE — DRSNG SURESITE WNDW 4X4.5